# Patient Record
Sex: MALE | Race: WHITE | NOT HISPANIC OR LATINO | Employment: OTHER | URBAN - METROPOLITAN AREA
[De-identification: names, ages, dates, MRNs, and addresses within clinical notes are randomized per-mention and may not be internally consistent; named-entity substitution may affect disease eponyms.]

---

## 2017-08-28 ENCOUNTER — HOSPITAL ENCOUNTER (EMERGENCY)
Facility: HOSPITAL | Age: 57
Discharge: HOME | End: 2017-08-28
Attending: EMERGENCY MEDICINE | Admitting: EMERGENCY MEDICINE

## 2017-08-28 VITALS
DIASTOLIC BLOOD PRESSURE: 96 MMHG | WEIGHT: 205 LBS | TEMPERATURE: 98.1 F | HEART RATE: 59 BPM | HEIGHT: 70 IN | OXYGEN SATURATION: 99 % | SYSTOLIC BLOOD PRESSURE: 150 MMHG | BODY MASS INDEX: 29.35 KG/M2 | RESPIRATION RATE: 18 BRPM

## 2017-08-28 DIAGNOSIS — F19.982 DRUG INDUCED INSOMNIA (HCC): ICD-10-CM

## 2017-08-28 DIAGNOSIS — T45.525A: Primary | ICD-10-CM

## 2017-08-28 LAB
AMPHET+METHAMPHET UR QL: NEGATIVE
BARBITURATES UR QL SCN: NEGATIVE
BENZODIAZ UR QL SCN: NEGATIVE
CANNABINOIDS SERPL QL: NEGATIVE
COCAINE UR QL: NEGATIVE
ETHANOL BLD-MCNC: <10 MG/DL (ref 0–10)
ETHANOL UR QL: <0.01 %
HOLD SPECIMEN: NORMAL
HOLD SPECIMEN: NORMAL
METHADONE UR QL SCN: NEGATIVE
OPIATES UR QL: NEGATIVE
OXYCODONE UR QL SCN: POSITIVE
WHOLE BLOOD HOLD SPECIMEN: NORMAL
WHOLE BLOOD HOLD SPECIMEN: NORMAL

## 2017-08-28 PROCEDURE — 36415 COLL VENOUS BLD VENIPUNCTURE: CPT | Performed by: EMERGENCY MEDICINE

## 2017-08-28 PROCEDURE — 99284 EMERGENCY DEPT VISIT MOD MDM: CPT

## 2017-08-28 PROCEDURE — 90791 PSYCH DIAGNOSTIC EVALUATION: CPT | Performed by: SOCIAL WORKER

## 2017-08-28 PROCEDURE — 80307 DRUG TEST PRSMV CHEM ANLYZR: CPT | Performed by: EMERGENCY MEDICINE

## 2017-08-28 RX ORDER — OXYCODONE HYDROCHLORIDE 30 MG/1
TABLET ORAL
Status: ON HOLD | COMMUNITY
Start: 2017-01-12 | End: 2019-11-25

## 2017-08-28 RX ORDER — LORAZEPAM 1 MG/1
1 TABLET ORAL 2 TIMES DAILY PRN
COMMUNITY

## 2017-08-28 RX ORDER — CYPROHEPTADINE HYDROCHLORIDE 4 MG/1
4 TABLET ORAL 3 TIMES DAILY PRN
COMMUNITY
Start: 2017-08-27 | End: 2017-09-06 | Stop reason: HOSPADM

## 2017-08-28 RX ORDER — ESCITALOPRAM OXALATE 20 MG/1
20 TABLET ORAL
COMMUNITY
End: 2017-09-06 | Stop reason: HOSPADM

## 2017-09-04 ENCOUNTER — HOSPITAL ENCOUNTER (INPATIENT)
Facility: HOSPITAL | Age: 57
LOS: 2 days | Discharge: HOME | End: 2017-09-06
Attending: INTERNAL MEDICINE | Admitting: INTERNAL MEDICINE

## 2017-09-04 ENCOUNTER — APPOINTMENT (OUTPATIENT)
Dept: CT IMAGING | Facility: HOSPITAL | Age: 57
End: 2017-09-04

## 2017-09-04 DIAGNOSIS — G47.00 INSOMNIA, UNSPECIFIED TYPE: Primary | ICD-10-CM

## 2017-09-04 PROBLEM — G25.79 SEROTONIN SYNDROME: Status: ACTIVE | Noted: 2017-09-04

## 2017-09-04 PROBLEM — F41.9 ANXIETY: Chronic | Status: ACTIVE | Noted: 2017-09-04

## 2017-09-04 PROBLEM — M54.50 CHRONIC BILATERAL LOW BACK PAIN: Chronic | Status: ACTIVE | Noted: 2017-09-04

## 2017-09-04 PROBLEM — F31.12 BIPOLAR 1 DISORDER WITH MODERATE MANIA (HCC): Status: ACTIVE | Noted: 2017-09-04

## 2017-09-04 PROBLEM — F32.A DEPRESSION: Chronic | Status: ACTIVE | Noted: 2017-09-04

## 2017-09-04 PROBLEM — G89.29 CHRONIC BILATERAL LOW BACK PAIN: Chronic | Status: ACTIVE | Noted: 2017-09-04

## 2017-09-04 PROBLEM — F30.10 MANIC BEHAVIOR (HCC): Status: ACTIVE | Noted: 2017-09-04

## 2017-09-04 PROBLEM — F30.9 MANIA (HCC): Status: ACTIVE | Noted: 2017-09-04

## 2017-09-04 LAB
ALBUMIN SERPL-MCNC: 4.3 G/DL (ref 3.5–5.2)
ALBUMIN/GLOB SERPL: 1.5 G/DL
ALP SERPL-CCNC: 128 U/L (ref 39–117)
ALT SERPL W P-5'-P-CCNC: 71 U/L (ref 1–41)
ANION GAP SERPL CALCULATED.3IONS-SCNC: 12.3 MMOL/L
APTT PPP: 36.2 SECONDS (ref 22.7–35.4)
AST SERPL-CCNC: 53 U/L (ref 1–40)
BASOPHILS # BLD AUTO: 0.05 10*3/MM3 (ref 0–0.2)
BASOPHILS NFR BLD AUTO: 0.9 % (ref 0–1.5)
BILIRUB SERPL-MCNC: 0.4 MG/DL (ref 0.1–1.2)
BUN BLD-MCNC: 13 MG/DL (ref 6–20)
BUN/CREAT SERPL: 12.4 (ref 7–25)
CALCIUM SPEC-SCNC: 9.7 MG/DL (ref 8.6–10.5)
CHLORIDE SERPL-SCNC: 101 MMOL/L (ref 98–107)
CK SERPL-CCNC: 140 U/L (ref 20–200)
CO2 SERPL-SCNC: 28.7 MMOL/L (ref 22–29)
CREAT BLD-MCNC: 1.05 MG/DL (ref 0.76–1.27)
CRP SERPL-MCNC: 1.07 MG/DL (ref 0–0.5)
DEPRECATED RDW RBC AUTO: 39.1 FL (ref 37–54)
EOSINOPHIL # BLD AUTO: 0.21 10*3/MM3 (ref 0–0.7)
EOSINOPHIL NFR BLD AUTO: 3.8 % (ref 0.3–6.2)
ERYTHROCYTE [DISTWIDTH] IN BLOOD BY AUTOMATED COUNT: 13.4 % (ref 11.5–14.5)
ERYTHROCYTE [SEDIMENTATION RATE] IN BLOOD: 41 MM/HR (ref 0–20)
GFR SERPL CREATININE-BSD FRML MDRD: 73 ML/MIN/1.73
GLOBULIN UR ELPH-MCNC: 2.9 GM/DL
GLUCOSE BLD-MCNC: 104 MG/DL (ref 65–99)
HCT VFR BLD AUTO: 37.6 % (ref 40.4–52.2)
HGB BLD-MCNC: 12.5 G/DL (ref 13.7–17.6)
IMM GRANULOCYTES # BLD: 0 10*3/MM3 (ref 0–0.03)
IMM GRANULOCYTES NFR BLD: 0 % (ref 0–0.5)
INR PPP: 1.01 (ref 0.9–1.1)
LYMPHOCYTES # BLD AUTO: 1.17 10*3/MM3 (ref 0.9–4.8)
LYMPHOCYTES NFR BLD AUTO: 21.1 % (ref 19.6–45.3)
MAGNESIUM SERPL-MCNC: 2.1 MG/DL (ref 1.6–2.6)
MCH RBC QN AUTO: 26.9 PG (ref 27–32.7)
MCHC RBC AUTO-ENTMCNC: 33.2 G/DL (ref 32.6–36.4)
MCV RBC AUTO: 81 FL (ref 79.8–96.2)
MONOCYTES # BLD AUTO: 0.63 10*3/MM3 (ref 0.2–1.2)
MONOCYTES NFR BLD AUTO: 11.4 % (ref 5–12)
NEUTROPHILS # BLD AUTO: 3.49 10*3/MM3 (ref 1.9–8.1)
NEUTROPHILS NFR BLD AUTO: 62.8 % (ref 42.7–76)
PHOSPHATE SERPL-MCNC: 3.1 MG/DL (ref 2.5–4.5)
PLATELET # BLD AUTO: 228 10*3/MM3 (ref 140–500)
PMV BLD AUTO: 8.9 FL (ref 6–12)
POTASSIUM BLD-SCNC: 3.9 MMOL/L (ref 3.5–5.2)
PROT SERPL-MCNC: 7.2 G/DL (ref 6–8.5)
PROTHROMBIN TIME: 12.9 SECONDS (ref 11.7–14.2)
RBC # BLD AUTO: 4.64 10*6/MM3 (ref 4.6–6)
SODIUM BLD-SCNC: 142 MMOL/L (ref 136–145)
T4 FREE SERPL-MCNC: 0.92 NG/DL (ref 0.93–1.7)
TROPONIN T SERPL-MCNC: <0.01 NG/ML (ref 0–0.03)
TSH SERPL DL<=0.05 MIU/L-ACNC: 1.15 MIU/ML (ref 0.27–4.2)
WBC NRBC COR # BLD: 5.55 10*3/MM3 (ref 4.5–10.7)

## 2017-09-04 PROCEDURE — 84484 ASSAY OF TROPONIN QUANT: CPT | Performed by: INTERNAL MEDICINE

## 2017-09-04 PROCEDURE — 83735 ASSAY OF MAGNESIUM: CPT | Performed by: INTERNAL MEDICINE

## 2017-09-04 PROCEDURE — 86140 C-REACTIVE PROTEIN: CPT | Performed by: INTERNAL MEDICINE

## 2017-09-04 PROCEDURE — 25010000002 THIAMINE PER 100 MG: Performed by: INTERNAL MEDICINE

## 2017-09-04 PROCEDURE — 82747 ASSAY OF FOLIC ACID RBC: CPT | Performed by: INTERNAL MEDICINE

## 2017-09-04 PROCEDURE — 93005 ELECTROCARDIOGRAM TRACING: CPT | Performed by: INTERNAL MEDICINE

## 2017-09-04 PROCEDURE — 25010000002 CYANOCOBALAMIN PER 1000 MCG: Performed by: INTERNAL MEDICINE

## 2017-09-04 PROCEDURE — 84100 ASSAY OF PHOSPHORUS: CPT | Performed by: INTERNAL MEDICINE

## 2017-09-04 PROCEDURE — 70450 CT HEAD/BRAIN W/O DYE: CPT

## 2017-09-04 PROCEDURE — 84443 ASSAY THYROID STIM HORMONE: CPT | Performed by: INTERNAL MEDICINE

## 2017-09-04 PROCEDURE — 85610 PROTHROMBIN TIME: CPT | Performed by: INTERNAL MEDICINE

## 2017-09-04 PROCEDURE — 82550 ASSAY OF CK (CPK): CPT | Performed by: INTERNAL MEDICINE

## 2017-09-04 PROCEDURE — 85014 HEMATOCRIT: CPT | Performed by: INTERNAL MEDICINE

## 2017-09-04 PROCEDURE — 84439 ASSAY OF FREE THYROXINE: CPT | Performed by: INTERNAL MEDICINE

## 2017-09-04 PROCEDURE — 25810000003 SODIUM CHLORIDE 0.9 % WITH KCL 20 MEQ 20-0.9 MEQ/L-% SOLUTION: Performed by: INTERNAL MEDICINE

## 2017-09-04 PROCEDURE — 85025 COMPLETE CBC W/AUTO DIFF WBC: CPT | Performed by: INTERNAL MEDICINE

## 2017-09-04 PROCEDURE — 93010 ELECTROCARDIOGRAM REPORT: CPT | Performed by: INTERNAL MEDICINE

## 2017-09-04 PROCEDURE — 85730 THROMBOPLASTIN TIME PARTIAL: CPT | Performed by: INTERNAL MEDICINE

## 2017-09-04 PROCEDURE — 85652 RBC SED RATE AUTOMATED: CPT | Performed by: INTERNAL MEDICINE

## 2017-09-04 PROCEDURE — 80053 COMPREHEN METABOLIC PANEL: CPT | Performed by: INTERNAL MEDICINE

## 2017-09-04 PROCEDURE — 82652 VIT D 1 25-DIHYDROXY: CPT | Performed by: INTERNAL MEDICINE

## 2017-09-04 PROCEDURE — 99253 IP/OBS CNSLTJ NEW/EST LOW 45: CPT | Performed by: PSYCHIATRY & NEUROLOGY

## 2017-09-04 PROCEDURE — 86592 SYPHILIS TEST NON-TREP QUAL: CPT | Performed by: INTERNAL MEDICINE

## 2017-09-04 RX ORDER — SODIUM CHLORIDE 0.9 % (FLUSH) 0.9 %
1-10 SYRINGE (ML) INJECTION AS NEEDED
Status: DISCONTINUED | OUTPATIENT
Start: 2017-09-04 | End: 2017-09-06 | Stop reason: HOSPADM

## 2017-09-04 RX ORDER — HYDROMORPHONE HYDROCHLORIDE 1 MG/ML
0.5 INJECTION, SOLUTION INTRAMUSCULAR; INTRAVENOUS; SUBCUTANEOUS EVERY 4 HOURS PRN
Status: DISCONTINUED | OUTPATIENT
Start: 2017-09-04 | End: 2017-09-06 | Stop reason: HOSPADM

## 2017-09-04 RX ORDER — CHOLECALCIFEROL (VITAMIN D3) 125 MCG
500 CAPSULE ORAL DAILY
Status: DISCONTINUED | OUTPATIENT
Start: 2017-09-04 | End: 2017-09-05

## 2017-09-04 RX ORDER — LORAZEPAM 1 MG/1
1 TABLET ORAL 2 TIMES DAILY PRN
Status: DISCONTINUED | OUTPATIENT
Start: 2017-09-04 | End: 2017-09-04

## 2017-09-04 RX ORDER — BISACODYL 10 MG
10 SUPPOSITORY, RECTAL RECTAL DAILY PRN
Status: DISCONTINUED | OUTPATIENT
Start: 2017-09-04 | End: 2017-09-06 | Stop reason: HOSPADM

## 2017-09-04 RX ORDER — ONDANSETRON 2 MG/ML
4 INJECTION INTRAMUSCULAR; INTRAVENOUS EVERY 6 HOURS PRN
Status: DISCONTINUED | OUTPATIENT
Start: 2017-09-04 | End: 2017-09-06 | Stop reason: HOSPADM

## 2017-09-04 RX ORDER — CYPROHEPTADINE HYDROCHLORIDE 4 MG/1
8 TABLET ORAL 3 TIMES DAILY
Status: DISCONTINUED | OUTPATIENT
Start: 2017-09-04 | End: 2017-09-04

## 2017-09-04 RX ORDER — CYANOCOBALAMIN 1000 UG/ML
1000 INJECTION, SOLUTION INTRAMUSCULAR; SUBCUTANEOUS ONCE
Status: COMPLETED | OUTPATIENT
Start: 2017-09-04 | End: 2017-09-04

## 2017-09-04 RX ORDER — TRAZODONE HYDROCHLORIDE 100 MG/1
200 TABLET ORAL NIGHTLY
Status: DISCONTINUED | OUTPATIENT
Start: 2017-09-04 | End: 2017-09-05

## 2017-09-04 RX ORDER — NALOXONE HCL 0.4 MG/ML
0.4 VIAL (ML) INJECTION
Status: DISCONTINUED | OUTPATIENT
Start: 2017-09-04 | End: 2017-09-06 | Stop reason: HOSPADM

## 2017-09-04 RX ORDER — ACETAMINOPHEN 325 MG/1
650 TABLET ORAL EVERY 6 HOURS PRN
Status: DISCONTINUED | OUTPATIENT
Start: 2017-09-04 | End: 2017-09-06 | Stop reason: HOSPADM

## 2017-09-04 RX ORDER — LORAZEPAM 1 MG/1
2 TABLET ORAL EVERY 6 HOURS PRN
Status: DISCONTINUED | OUTPATIENT
Start: 2017-09-04 | End: 2017-09-06 | Stop reason: HOSPADM

## 2017-09-04 RX ORDER — CYPROHEPTADINE HYDROCHLORIDE 4 MG/1
8 TABLET ORAL 3 TIMES DAILY
Status: DISCONTINUED | OUTPATIENT
Start: 2017-09-04 | End: 2017-09-05

## 2017-09-04 RX ORDER — SODIUM CHLORIDE AND POTASSIUM CHLORIDE 150; 900 MG/100ML; MG/100ML
125 INJECTION, SOLUTION INTRAVENOUS CONTINUOUS
Status: DISCONTINUED | OUTPATIENT
Start: 2017-09-04 | End: 2017-09-05

## 2017-09-04 RX ORDER — CALCIUM CARBONATE 200(500)MG
1 TABLET,CHEWABLE ORAL 2 TIMES DAILY PRN
Status: DISCONTINUED | OUTPATIENT
Start: 2017-09-04 | End: 2017-09-06 | Stop reason: HOSPADM

## 2017-09-04 RX ORDER — OXYCODONE HYDROCHLORIDE 15 MG/1
30 TABLET ORAL EVERY 6 HOURS PRN
Status: DISCONTINUED | OUTPATIENT
Start: 2017-09-04 | End: 2017-09-06 | Stop reason: HOSPADM

## 2017-09-04 RX ORDER — SERTRALINE HYDROCHLORIDE 100 MG/1
100 TABLET, FILM COATED ORAL DAILY
COMMUNITY

## 2017-09-04 RX ADMIN — LORAZEPAM 2 MG: 1 TABLET ORAL at 17:50

## 2017-09-04 RX ADMIN — CYANOCOBALAMIN 1000 MCG: 1000 INJECTION, SOLUTION INTRAMUSCULAR at 13:30

## 2017-09-04 RX ADMIN — CYPROHEPTADINE HYDROCHLORIDE 8 MG: 4 TABLET ORAL at 22:19

## 2017-09-04 RX ADMIN — OXYCODONE HYDROCHLORIDE 30 MG: 15 TABLET ORAL at 22:18

## 2017-09-04 RX ADMIN — OXYCODONE HYDROCHLORIDE 30 MG: 15 TABLET ORAL at 13:28

## 2017-09-04 RX ADMIN — CYANOCOBALAMIN TAB 500 MCG 500 MCG: 500 TAB at 13:29

## 2017-09-04 RX ADMIN — THIAMINE HYDROCHLORIDE 100 MG: 100 INJECTION, SOLUTION INTRAMUSCULAR; INTRAVENOUS at 13:49

## 2017-09-04 RX ADMIN — POTASSIUM CHLORIDE AND SODIUM CHLORIDE 125 ML/HR: 900; 150 INJECTION, SOLUTION INTRAVENOUS at 22:18

## 2017-09-04 RX ADMIN — TRAZODONE HYDROCHLORIDE 200 MG: 100 TABLET, FILM COATED ORAL at 22:18

## 2017-09-04 RX ADMIN — POTASSIUM CHLORIDE AND SODIUM CHLORIDE 125 ML/HR: 900; 150 INJECTION, SOLUTION INTRAVENOUS at 13:49

## 2017-09-04 NOTE — CONSULTS
Patient Identification:  NAME:  German Sotelo  Age:  57 y.o.   Sex:  male   :  1960   MRN:  [id number]       Chief complaint: He doesn't have one, reason for consult depression anxiety confusion possible serotonin syndrome    History of present illness:  This gentleman is a 57-year-old right-handed white male with a history of depression and insomnia previous history of serotonin syndrome who over the last few weeks was switched from Zoloft to Lexapro.  He is been becoming more agitated and restless not wanting to sleep having to go for a walk instead of going to bed at night he did have serotonin syndrome as context about 5 years ago.  They thought his family this time 30 could be having recurrent serotonin syndrome and Lexapro.  Zoloft reinitiated he didn't get any better is come to the hospital and has been started on cyproheptadine.  Duration of the symptoms been the last few weeks quality is progressive onset of the symptoms context patient had previous serotonin syndrome modifying factors here we stopped the Zoloft.  And he is on cyproheptadine  Past medical history:  Past Medical History:   Diagnosis Date   • Anxiety    • Depression    • Insomnia    • Serotonin syndrome 2017   • Spinal stenosis        Past surgical history:  Past Surgical History:   Procedure Laterality Date   • BACK SURGERY      SPINAL FUSION   • JOINT REPLACEMENT     • KNEE ARTHROPLASTY Left    • PAIN PUMP INSERTION/REVISION      SEVERAL TIMES STARATED  TOTAL OF FOUR    • PAIN PUMP INSERTION/REVISION Left 2016    Procedure: LT PAIN PUMP REMOVAL;  Surgeon: Esvin Maya MD;  Location: [facility];  Service:        Allergies:  Lunesta [eszopiclone]    Home medications:  Prescriptions Prior to Admission   Medication Sig Dispense Refill Last Dose   • sertraline (ZOLOFT) 100 MG tablet Take 100 mg by mouth Daily.      • cyproheptadine (PERIACTIN) 4 MG tablet Take 4 mg by mouth 3 (Three) Times a Day As  Needed.      • escitalopram (LEXAPRO) 20 MG tablet Take 20 mg by mouth.      • LORazepam (ATIVAN) 1 MG tablet Take 1 mg by mouth 2 (Two) Times a Day As Needed for Anxiety.      • oxyCODONE (ROXICODONE) 30 MG immediate release tablet every 4 (four) hours as needed       • traZODone (DESYREL) 100 MG tablet Take 200 mg by mouth Every Night.   7/10/2016 at 2300        Hospital medications:    cyproheptadine 8 mg Oral TID   [START ON 9/5/2017] enoxaparin 40 mg Subcutaneous Nightly   thiamine (VITAMIN B1) IVPB 100 mg Intravenous Daily   traZODone 200 mg Oral Nightly   vitamin B-12 500 mcg Oral Daily       Pharmacy Consult    sodium chloride 0.9 % with KCl 20 mEq 125 mL/hr     •  acetaminophen  •  bisacodyl  •  calcium carbonate  •  HYDROmorphone **AND** naloxone  •  LORazepam  •  magnesium hydroxide  •  ondansetron  •  oxyCODONE  •  Pharmacy Consult  •  sodium chloride    Family history:  No family history on file.    Social history:  Social History   Substance Use Topics   • Smoking status: Never Smoker   • Smokeless tobacco: Not on file   • Alcohol use No       Review of systems:    No hair eyes ears nose throat skin bone joint  lymphatic hematologic or oncologic plates no neck pain chest pain abdominal pain bowel bladder incontinence fever chills or rash mental status changes restlessness confusion poor sleep and overall poor balance.      Objective:  Vitals Ranges:   Temp:  [98.2 °F (36.8 °C)] 98.2 °F (36.8 °C)  Heart Rate:  [72] 72  Resp:  [20] 20  BP: (114)/(81) 114/81      Physical Exam:  Awake alert oriented ×3 in no distress fund of knowledge attention span and concentration recent remote memory and language is all normal well-developed well-nourished in no distress pupils to constricting to 1 normal disc retinas visual fields extraocular movements full without nystagmus nasolabial folds palate tongue symmetrical normal hearing facial sensation head turning shoulder shrug motor 5 out of 5 times for 70s no  pronator drift atrophy or fasciculations reflexes 1+ symmetrical toes downgoing bilaterally sensation normal light touch face both arms and both legs coordination normal in the upper externally station and gait was not tested heart regular without murmur neck supple without bruits extremities no clubbing cyanosis or edema awake alert oriented ×3 visual acuity normal at 3 feet    Results review:   I reviewed the patient's new clinical results.    Data review:  Lab Results (last 24 hours)     Procedure Component Value Units Date/Time    Vitamin D 1,25 Dihydroxy [[id number]] Collected:  09/04/17 0922    Specimen:  Blood Updated:  09/04/17 0930    Folate RBC [[id number]] Collected:  09/04/17 0922    Specimen:  Blood Updated:  09/04/17 0930    RPR [[id number]] Collected:  09/04/17 0922    Specimen:  Blood Updated:  09/04/17 0932    CBC Auto Differential [[id number]]  (Abnormal) Collected:  09/04/17 0922    Specimen:  Blood Updated:  09/04/17 0939     WBC 5.55 10*3/mm3      RBC 4.64 10*6/mm3      Hemoglobin 12.5 (L) g/dL      Hematocrit 37.6 (L) %      MCV 81.0 fL      MCH 26.9 (L) pg      MCHC 33.2 g/dL      RDW 13.4 %      RDW-SD 39.1 fl      MPV 8.9 fL      Platelets 228 10*3/mm3      Neutrophil % 62.8 %      Lymphocyte % 21.1 %      Monocyte % 11.4 %      Eosinophil % 3.8 %      Basophil % 0.9 %      Immature Grans % 0.0 %      Neutrophils, Absolute 3.49 10*3/mm3      Lymphocytes, Absolute 1.17 10*3/mm3      Monocytes, Absolute 0.63 10*3/mm3      Eosinophils, Absolute 0.21 10*3/mm3      Basophils, Absolute 0.05 10*3/mm3      Immature Grans, Absolute 0.00 10*3/mm3     aPTT [[id number]]  (Abnormal) Collected:  09/04/17 0922    Specimen:  Blood Updated:  09/04/17 0955     PTT 36.2 (H) seconds     Protime-INR [[id number]]  (Normal) Collected:  09/04/17 0922    Specimen:  Blood Updated:  09/04/17 0955     Protime 12.9 Seconds      INR 1.01    Magnesium [[id number]]  (Normal) Collected:  09/04/17 0922    Specimen:  Blood Updated:   09/04/17 1004     Magnesium 2.1 mg/dL     CK [[id number]]  (Normal) Collected:  09/04/17 0922    Specimen:  Blood Updated:  09/04/17 1004     Creatine Kinase 140 U/L     Comprehensive Metabolic Panel [[id number]]  (Abnormal) Collected:  09/04/17 0922    Specimen:  Blood Updated:  09/04/17 1004     Glucose 104 (H) mg/dL      BUN 13 mg/dL      Creatinine 1.05 mg/dL      Sodium 142 mmol/L      Potassium 3.9 mmol/L      Chloride 101 mmol/L      CO2 28.7 mmol/L      Calcium 9.7 mg/dL      Total Protein 7.2 g/dL      Albumin 4.30 g/dL      ALT (SGPT) 71 (H) U/L      AST (SGOT) 53 (H) U/L      Alkaline Phosphatase 128 (H) U/L      Total Bilirubin 0.4 mg/dL      eGFR Non African Amer 73 mL/min/1.73      Globulin 2.9 gm/dL      A/G Ratio 1.5 g/dL      BUN/Creatinine Ratio 12.4     Anion Gap 12.3 mmol/L     Phosphorus [[id number]]  (Normal) Collected:  09/04/17 0922    Specimen:  Blood Updated:  09/04/17 1004     Phosphorus 3.1 mg/dL     C-reactive Protein [[id number]]  (Abnormal) Collected:  09/04/17 0922    Specimen:  Blood Updated:  09/04/17 1004     C-Reactive Protein 1.07 (H) mg/dL     Sedimentation Rate [[id number]]  (Abnormal) Collected:  09/04/17 0922    Specimen:  Blood Updated:  09/04/17 1013     Sed Rate 41 (H) mm/hr     T4, Free [[id number]]  (Abnormal) Collected:  09/04/17 0922    Specimen:  Blood Updated:  09/04/17 1013     Free T4 0.92 (L) ng/dL     Troponin [[id number]]  (Normal) Collected:  09/04/17 0922    Specimen:  Blood Updated:  09/04/17 1013     Troponin T <0.010 ng/mL     Narrative:       Troponin T Reference Ranges:  Less than 0.03 ng/mL:    Negative for AMI  0.03 to 0.09 ng/mL:      Indeterminant for AMI  Greater than 0.09 ng/mL: Positive for AMI    TSH [[id number]]  (Normal) Collected:  09/04/17 0922    Specimen:  Blood Updated:  09/04/17 1013     TSH 1.150 mIU/mL            Imaging:  Imaging Results (last 24 hours)     ** No results found for the last 24 hours. **             Assessment and Plan:      Principal Problem:    Bonita  Active Problems:    Insomnia    Depression    Anxiety    Chronic bilateral low back pain    Manic behavior    This gentleman is behavior it seems consistent with classic bipolar disorder.  I'm not convinced that he is had a serotonin syndrome nor does he look like he has a stroke TIA seizure or central nervous system infection.  I agree with checking MRI of the brain as well as an EEG thyroid profile and B12.  His loved ones in the room don't seem to believe that he could have bipolar disorder and think it's something to do with his medications.  It is possible but I just think unlikely.      True Livingston MD  09/04/17  1:36 PM

## 2017-09-04 NOTE — H&P
"PCP: Umm Roy MD    Chief complaint restless, insomnia    HPI  Patient is a 57 y.o. male presents with h/o Serotonin Syndrome about 5 years ago who presents with height mood, irritable, restless, insomnia, shaking.  Patient has had multiple back surgeries that had complications and the fallout has been chronic pain (pain pumps that have malfunctioned and currently on PO pain med thru pain management).  He has struggled with depression and to a lesser degree anxiety. Patient has been followed by Dr. Del Cid as an outpatient for years and has been on Zoloft for years and 1 month ago the patient was switched from sertraline 200 mg to Lexapro 20 mg (without taper) due to the patient really struggling with depressed mood and \"wanted to be dead\".  9 days prior to admission the patient became agitated and restless and the family went to [facility] the next day and they thought was possible serotonin syndrome and was given antihistamine/cyproheptadine, however it did not seem to help much.  They followed up with the primary care doctor and she felt uncomfortable with the patient and told them to go to [facility] to be evaluated because they had a inpatient psychiatry unit.  Therefore, 1 week ago the patient was seen here in the ER and the psych nurse evaluated and since the patient had only been like this for a couple days they did not feel like met admission criteria, urine drug screen only revealing for his chronic opiates.  The ER stopped the Lexapro at that time.  The patient continued to have restlessness, not sleeping despite taking Ativan multiple doses even though he does not take this on a regular basis.  He had increased energy, wandering, impaired judgment he took his shirt off in a waiting room, acting oddly at daughter's baby shower.  He lacks insight and unable to remember periods of time.  Patient just keeps saying that there is something wrong in his head and he can't control it.  He is only " been sleeping about 2 hours a night for the past 9 days now (and that after multiple doses of Ativan ). The wife has not been able to leave him for the past week except for with a sitter.  She is concerned that the patient could harm himself inadvertently.  He's been hyperactive with walking 10 miles and wouldn't stop.  He gets distracted and obsessed with trash in the emergency room and he went around picking things up.  He is had shaking episodes with his hands and picking at things.     Patient went back to his psychiatrist who has known him for years and per wife's report he knew the patient's personality and this is not like him.  He wasn't improving and he did not feel like it was medication related however he placed him on a lower dose (100mg) of Zoloft 3 days ago.  They call primary care doctor back and she did not know what to do next and just said to go to [facility].  Patient took cyproheptadine 8 mg last night and 2 mg of Ativan.  He did get 3-4 hours of sleep last night which is more than what he is gotten this whole week.     Patient states that he has to just keep moving and he can't control it.  It's hard to keep his balance.  He says his thoughts are racing but they don't make sense.  He doesn't remember anything clearly.  There are bits and pieces but hold days are missing over this past week.  Denies any auditory hallucinations he says that he does have some visual things where he thinks he sees something and then it's not better.  His thoughts of an racing he says this feels similar to when he had the serotonin syndrome 5 years ago however it did not escalate to that extreme but has maintained and not improved nor gotten worse. Decreased eating and appetite however they state that he's had a 5-6 pound weight gain from when he was laid in the ER until now.  Wife is monitoring blood pressures been 140/90 with heart rate in the 70s at home.  However he has not been able to do his ADLs.   Denies any diarrhea and no fevers no sweating known dilated eyes.      PAST MEDICAL HISTORY  Past Medical History:   Diagnosis Date   • Anxiety    • Depression    • Insomnia    • Serotonin syndrome 5-6 yrs ago   • Spinal stenosis        PAST SURGICAL HISTORY  Past Surgical History:   Procedure Laterality Date   • BACK SURGERY  2004    SPINAL FUSION   • KNEE ARTHROPLASTY Left 2008   • PAIN PUMP INSERTION/REVISION      SEVERAL TIMES STARATED 2010 TOTAL OF FOUR    • PAIN PUMP INSERTION/REVISION Left 7/11/2016    Procedure: LT PAIN PUMP REMOVAL;  Surgeon: Esvin Maya MD;  Location: [facility] OR;  Service:        FAMILY HISTORY  No family history on file.    SOCIAL HISTORY  Social History   Substance Use Topics   • Smoking status: Never Smoker   • Smokeless tobacco: Not on file   • Alcohol use No       MEDICATIONS:  Prescriptions Prior to Admission   Medication Sig Dispense Refill Last Dose   • cyproheptadine (PERIACTIN) 4 MG tablet Take 4 mg by mouth.      • escitalopram (LEXAPRO) 20 MG tablet Take 20 mg by mouth.      • LORazepam (ATIVAN) 1 MG tablet Take 1 mg by mouth 2 (Two) Times a Day As Needed for Anxiety.      • oxyCODONE (ROXICODONE) 30 MG immediate release tablet every 4 (four) hours as needed       • traZODone (DESYREL) 100 MG tablet Take 200 mg by mouth Every Night.   7/10/2016 at 2300       Allergies:  Lunesta [eszopiclone]    Review of Systems:  +visual hallucinations, anxiousness  Negative for following (except as per HPI):  Constitution: chills, fevers, fatigue   Eyes: change of vision, loss of vision and discharge  ENT: ear drainage, ear ringing and facial trauma  Respiratory: cough, pleuritic pain, shortness of air  Cardiovascular: chest pressure, pain, lower extremity edema, palpitations  Gastrointestinal: constipation, diarrhea, nausea, vomiting, pain    Integument: rash and wound  Hematologic / Lymphatic: excessive bleeding and easy bruising  Musculoskeletal: joint pain, joint stiffness,  joint swelling and muscle pain  Neurological: headaches, numbness, seizures and tremors  Behavioral / Psych: , depression         Vital Signs  Temp:  [98.2 °F (36.8 °C)] 98.2 °F (36.8 °C)  Heart Rate:  [72] 72  Resp:  [20] 20  BP: (114)/(81) 114/81       Physical Exam:  General Appearance:    Alert, cooperative, in no acute distress, Fidgeting    Head:    Normocephalic, without obvious abnormality, atraumatic   Eyes:         conjunctivae and sclerae normal, no icterus, PERRLA   ENT:    Ears grossly intact, oral mucosa moist,   Neck:   No adenopathy, supple, trachea midline,    Back:     Normal to inspection, range of motion normal   Lungs:     Clear to auscultation,respirations regular, even and                   unlabored    Heart:    Regular rhythm and normal rate,  no murmur, normal S1 and S2,   Abdomen:     Normal bowel sounds, no masses,  soft non-tender, non-distended,    Extremities:   Moves all extremities well, no cyanosis, no edema,             Pulses:   Pulses palpable and equal bilaterally   Skin:   No bleeding, rash, bruising    Neurologic:    Psych:   Cranial nerves 2 - 12 grossly intact, sensation intact,     Moves all extremities well, equal bilateral strength, no rigidity    Alert and Oriented x 3, flat Affect, poor insight, seems fearful   LABS:  Admission on 09/04/2017   Component Date Value Ref Range Status   • WBC 09/04/2017 5.55  4.50 - 10.70 10*3/mm3 Final   • RBC 09/04/2017 4.64  4.60 - 6.00 10*6/mm3 Final   • Hemoglobin 09/04/2017 12.5* 13.7 - 17.6 g/dL Final   • Hematocrit 09/04/2017 37.6* 40.4 - 52.2 % Final   • MCV 09/04/2017 81.0  79.8 - 96.2 fL Final   • MCH 09/04/2017 26.9* 27.0 - 32.7 pg Final   • MCHC 09/04/2017 33.2  32.6 - 36.4 g/dL Final   • RDW 09/04/2017 13.4  11.5 - 14.5 % Final   • RDW-SD 09/04/2017 39.1  37.0 - 54.0 fl Final   • MPV 09/04/2017 8.9  6.0 - 12.0 fL Final   • Platelets 09/04/2017 228  140 - 500 10*3/mm3 Final   • Neutrophil % 09/04/2017 62.8  42.7 - 76.0 % Final    • Lymphocyte % 09/04/2017 21.1  19.6 - 45.3 % Final   • Monocyte % 09/04/2017 11.4  5.0 - 12.0 % Final   • Eosinophil % 09/04/2017 3.8  0.3 - 6.2 % Final   • Basophil % 09/04/2017 0.9  0.0 - 1.5 % Final   • Immature Grans % 09/04/2017 0.0  0.0 - 0.5 % Final   • Neutrophils, Absolute 09/04/2017 3.49  1.90 - 8.10 10*3/mm3 Final   • Lymphocytes, Absolute 09/04/2017 1.17  0.90 - 4.80 10*3/mm3 Final   • Monocytes, Absolute 09/04/2017 0.63  0.20 - 1.20 10*3/mm3 Final   • Eosinophils, Absolute 09/04/2017 0.21  0.00 - 0.70 10*3/mm3 Final   • Basophils, Absolute 09/04/2017 0.05  0.00 - 0.20 10*3/mm3 Final   • Immature Grans, Absolute 09/04/2017 0.00  0.00 - 0.03 10*3/mm3 Final   • PTT 09/04/2017 36.2* 22.7 - 35.4 seconds Final   • Creatine Kinase 09/04/2017 140  20 - 200 U/L Final   • Glucose 09/04/2017 104* 65 - 99 mg/dL Final   • BUN 09/04/2017 13  6 - 20 mg/dL Final   • Creatinine 09/04/2017 1.05  0.76 - 1.27 mg/dL Final   • Sodium 09/04/2017 142  136 - 145 mmol/L Final   • Potassium 09/04/2017 3.9  3.5 - 5.2 mmol/L Final   • Chloride 09/04/2017 101  98 - 107 mmol/L Final   • CO2 09/04/2017 28.7  22.0 - 29.0 mmol/L Final   • Calcium 09/04/2017 9.7  8.6 - 10.5 mg/dL Final   • Total Protein 09/04/2017 7.2  6.0 - 8.5 g/dL Final   • Albumin 09/04/2017 4.30  3.50 - 5.20 g/dL Final   • ALT (SGPT) 09/04/2017 71* 1 - 41 U/L Final   • AST (SGOT) 09/04/2017 53* 1 - 40 U/L Final   • Alkaline Phosphatase 09/04/2017 128* 39 - 117 U/L Final   • Total Bilirubin 09/04/2017 0.4  0.1 - 1.2 mg/dL Final   • eGFR Non African Amer 09/04/2017 73  >60 mL/min/1.73 Final   • Globulin 09/04/2017 2.9  gm/dL Final   • A/G Ratio 09/04/2017 1.5  g/dL Final   • BUN/Creatinine Ratio 09/04/2017 12.4  7.0 - 25.0 Final   • Anion Gap 09/04/2017 12.3  mmol/L Final   • Free T4 09/04/2017 0.92* 0.93 - 1.70 ng/dL Final   • Magnesium 09/04/2017 2.1  1.6 - 2.6 mg/dL Final   • Phosphorus 09/04/2017 3.1  2.5 - 4.5 mg/dL Final   • Protime 09/04/2017 12.9  11.7 -  14.2 Seconds Final   • INR 09/04/2017 1.01  0.90 - 1.10 Final   • Troponin T 09/04/2017 <0.010  0.000 - 0.030 ng/mL Final   • TSH 09/04/2017 1.150  0.270 - 4.200 mIU/mL Final   • Sed Rate 09/04/2017 41* 0 - 20 mm/hr Final   • C-Reactive Protein 09/04/2017 1.07* 0.00 - 0.50 mg/dL Final       DIAGNOSTICS:  No results found.       Results Review:   I reviewed the patient's new clinical results.  I personally viewed and interpreted the patient's EKG/Telemetry data- sinus  Old records reviewed - see above  Outside records requested per Dr. Del Cid and Dr. Roy    ASSESSMENT AND PLAN     +Acute Bonita/Manic Behavior- likely organic bonita/secondary caused  -Differential diagnosis: NEURO (seizure, neoplasm, stroke, less likely [place]'s/MS, No h/o trauma), TOXIC (anti-depressant-induced bonita, Serotonin synd), METABOLIC (thyroid, thiamine, B12, niacin deficiencies, [place]), PSYCH, vs INFECTIOUS (neurosyphilis, herpes, encephalitis)  -Discussed with pharmacist and she would've thought that since the Zoloft and Lexapro were not titrated down and that the serotonin syndrome would've manifested itself about 1 week later and the symptoms started about 4 weeks after.  The cyproheptadine does not seem to have helped much so not sure that this is true serotonin syndrome and he didn't have any muscle rigidity diarrhea or sweating either.   -Continue in the cyproheptadine for now, holding the SSRI  -Getting CT head  -Consult neurology and psychiatry  -Pharmacy consult for if this is a antidepressant-induced bonita should we taper or stop SSRI completely  -EEG  -Labs to look for vitamin D, folate, thiamine, B12, thyroid, a.m. Cortisol, rpr  -If unrevealing could consider MRI to rule out tumor or vascular lesion and LP to look for herpes encephalitis, viral encephalitis, and/or cryptococcal (has been seen in non-immunosuppressed)    + Insomnia/  Depression/  Anxiety  - Holding SSRI until after psychiatrist sees patient  -We'll  continue the trazodone     + Chronic bilateral low back pain  -Patient is on oxycodone immediate release 4 times a day when necessary    + DVT prophylaxis-lovenox 40  + Medical decision maker-wife Yahaira    I discussed the patients findings and my recommendations with the patient and/or family.  Please reference all orders placed.    Pinky Hill MD  09/04/17  8:23 AM

## 2017-09-04 NOTE — CONSULTS
"IDENTIFYING INFORMATION: The patient is a 57-year-old white male admitted with confusion and mental status changes possibly consistent with a serotonin syndrome.    CHIEF COMPLAINT:  None given    INFORMANT:  Patient, wife and chart    RELIABILITY:  Good    HISTORY OF PRESENT ILLNESS: The patient is a 57-year-old white male who is been followed by Dr. Vannesa Del Cid for the past 13 years.  The patient has a history of positive response to Zoloft, but this medication was recently discontinued and the patient instead begun on Lexapro after Dr. Del Cid and the patient agreed that Zoloft did not seem to be as effective in addressing his depressive symptoms.  With that medication change, the patient began to experience mental status changes, restlessness, confusion, poor sleep and poor balance.  5 years ago, the patient apparently had an episode of serotonin syndrome which been brought on by addition of Lunesta to his previously prescribed antidepressant regimen.  Dr. Del Cid and the patient's family became concerned that he was perhaps having a recurrence of serotonin syndrome, Lexapro was stopped, and Zoloft reinitiated.  The patient's symptoms did not resolve with that medication change however.  Since that time Zoloft has been discontinued and the patient has been started on cyproheptadine.  When seen today the patient seemed calm and pleasant cooperative and exhibits no confusion or other stigmata of serotonin syndrome or delirium.  Review of labs does not indicate an alternate etiology for his current symptoms.    The patient lives at home with his wife.  He does not work having been disabled by a series of \"failed back surgeries\".  He suffers from chronic pain and is on chronic pain medication.    Patient denies current suicidal homicidal risk ideation or any psychotic symptoms.  He is fully oriented and pleasant during today's interview.    PAST PSYCHIATRIC HISTORY: The patient is never been psychiatric " hospitalized and denies any prior history of suicide attempts or gestures.  He has been recently stable with fairly conservative antidepressant therapy.    PAST MEDICAL HISTORY:  As noted previously the patient has a history of chronic back pain and takes Roxicodone at home for this    MEDICATIONS: The patient's home medications included Zoloft cyproheptadine Ativan Roxicodone and Desyrel    ALLERGIES:  Lunesta    FAMILY HISTORY:  Noncontributory    SOCIAL HISTORY: The patient lives with his wife, and is disabled as described previously.  There is no reported use of alcohol tobacco or street drugs.    MENTAL STATUS EXAM: Patient is well-developed well-nourished white male appearing his stated age.  He is no apparent physical distress at the time of the examination.  He is awake alert and oriented in all spheres, his mood is euthymic his affect congruent.  Speech is generally relevant and coherent there are no gross deficits memory cognition noted.  The patient is cooperative throughout interview, his intelligence is judged to be in the average range.  He denies current suicidal homicidal ideation or psychotic features.  His judgment and insight appear to be intact.    ASSETS/LIABILITIES: Supportive family/chronic pain    DIAGNOSTIC IMPRESSION: Major depressive disorder recurrent moderate, delirium, possible serotonin syndrome, chronic back pain    PLAN:  The patient's current symptoms may be consistent with a serotonin syndrome though I would submit that some of his symptoms could possibly consistent with a delirium or even a hypomanic episode.  Administration of a selective serotonin reuptake inhibitor, particularly one that has been tolerated by the patient for several years, without additional risk factors (other serotonergic agents such as tramadol, Ultram or certain migraine medications) would not tend to cause serotonin syndrome.  Further, his symptoms don't seem to be entirely consistent with serotonin  syndrome given absence of diarrhea and neuromuscular symptoms.  I fully agree with involvement of neurology to see what their opinion of the patient's current status is.    In the meantime, I would recommend holding antidepressant medication, leaving to the discretion of Dr. Del Cid reinitiation of medications from a different class if in fact it is felt that the patient has in fact suffered for serotonin syndrome.  I we will increase dramatically the frequency and dose of Ativan given the degree of concern the patient's and his family have over his current symptoms of anxiety.    Thank you very much for the opportunity to see this patient.

## 2017-09-04 NOTE — PLAN OF CARE
Problem: Patient Care Overview (Adult)  Goal: Discharge Needs Assessment    09/04/17 2660   Discharge Needs Assessment   Concerns To Be Addressed no discharge needs identified   Readmission Within The Last 30 Days no previous admission in last 30 days   Equipment Needed After Discharge none   Discharge Facility/Level Of Care Needs home with home health   Current Discharge Risk chronically ill   Discharge Disposition still a patient   Current Health   Anticipated Changes Related to Illness none   Self-Care   Equipment Currently Used at Home none   Living Environment   Transportation Available car;family or friend will provide         Problem: Manic Behavior/Episode (Adult)  Goal: Identify Related Risk Factors and Signs and Symptoms  Outcome: Outcome(s) achieved Date Met:  09/04/17  Goal: Mood Equilibrium  Outcome: Ongoing (interventions implemented as appropriate)  Goal: Impulse Control  Outcome: Ongoing (interventions implemented as appropriate)    Problem: Infection, Risk/Actual (Adult)  Goal: Identify Related Risk Factors and Signs and Symptoms  Outcome: Outcome(s) achieved Date Met:  09/04/17  Goal: Infection Prevention/Resolution  Outcome: Ongoing (interventions implemented as appropriate)

## 2017-09-04 NOTE — PROGRESS NOTES
Pharmacy Consult    Patient was switched from sertraline 200mg to escitalopram 20mg (time frame unknown). Per consult notes, 1 month later was having 1 week history of restless, insomnia, shaking/myoclonus.  Escitalopram was stopped in ER 1 week ago and psychiatry started patient back on sertraline 3 days ago.    Without knowing more extensive patient medication and psychiatric history, it is difficult to  this patient's current situation. It is recommended that sertraline be slowly tapered off, and I do not know if/how this was done.    Would recommend holding sertraline for now and waiting to see psychiatry recommendations.    Thank you for consult,    Cheri Nolan, PharmD  9/4/2017  9:33 AM

## 2017-09-05 ENCOUNTER — APPOINTMENT (OUTPATIENT)
Dept: NEUROLOGY | Facility: HOSPITAL | Age: 57
End: 2017-09-05
Attending: PSYCHIATRY & NEUROLOGY

## 2017-09-05 ENCOUNTER — APPOINTMENT (OUTPATIENT)
Dept: MRI IMAGING | Facility: HOSPITAL | Age: 57
End: 2017-09-05
Attending: PSYCHIATRY & NEUROLOGY

## 2017-09-05 LAB
ANION GAP SERPL CALCULATED.3IONS-SCNC: 13.9 MMOL/L
BASOPHILS # BLD AUTO: 0.03 10*3/MM3 (ref 0–0.2)
BASOPHILS NFR BLD AUTO: 0.7 % (ref 0–1.5)
BUN BLD-MCNC: 14 MG/DL (ref 6–20)
BUN/CREAT SERPL: 13.7 (ref 7–25)
CALCIUM SPEC-SCNC: 8.5 MG/DL (ref 8.6–10.5)
CHLORIDE SERPL-SCNC: 102 MMOL/L (ref 98–107)
CO2 SERPL-SCNC: 27.1 MMOL/L (ref 22–29)
CORTIS SERPL-MCNC: 2.76 MCG/DL
CREAT BLD-MCNC: 1.02 MG/DL (ref 0.76–1.27)
DEPRECATED RDW RBC AUTO: 41.3 FL (ref 37–54)
EOSINOPHIL # BLD AUTO: 0.23 10*3/MM3 (ref 0–0.7)
EOSINOPHIL NFR BLD AUTO: 5 % (ref 0.3–6.2)
ERYTHROCYTE [DISTWIDTH] IN BLOOD BY AUTOMATED COUNT: 13.2 % (ref 11.5–14.5)
FOLATE BLD-MCNC: 329.7 NG/ML
FOLATE RBC-MCNC: 886 NG/ML
GFR SERPL CREATININE-BSD FRML MDRD: 76 ML/MIN/1.73
GLUCOSE BLD-MCNC: 108 MG/DL (ref 65–99)
HCT VFR BLD AUTO: 37.2 % (ref 37.5–51)
HCT VFR BLD AUTO: 37.5 % (ref 40.4–52.2)
HGB BLD-MCNC: 12 G/DL (ref 13.7–17.6)
IMM GRANULOCYTES # BLD: 0 10*3/MM3 (ref 0–0.03)
IMM GRANULOCYTES NFR BLD: 0 % (ref 0–0.5)
LYMPHOCYTES # BLD AUTO: 1.48 10*3/MM3 (ref 0.9–4.8)
LYMPHOCYTES NFR BLD AUTO: 32.5 % (ref 19.6–45.3)
MCH RBC QN AUTO: 27.5 PG (ref 27–32.7)
MCHC RBC AUTO-ENTMCNC: 32 G/DL (ref 32.6–36.4)
MCV RBC AUTO: 85.8 FL (ref 79.8–96.2)
MONOCYTES # BLD AUTO: 0.51 10*3/MM3 (ref 0.2–1.2)
MONOCYTES NFR BLD AUTO: 11.2 % (ref 5–12)
NEUTROPHILS # BLD AUTO: 2.31 10*3/MM3 (ref 1.9–8.1)
NEUTROPHILS NFR BLD AUTO: 50.6 % (ref 42.7–76)
PLATELET # BLD AUTO: 217 10*3/MM3 (ref 140–500)
PMV BLD AUTO: 9.1 FL (ref 6–12)
POTASSIUM BLD-SCNC: 4 MMOL/L (ref 3.5–5.2)
RBC # BLD AUTO: 4.37 10*6/MM3 (ref 4.6–6)
RPR SER QL: NORMAL
SODIUM BLD-SCNC: 143 MMOL/L (ref 136–145)
VIT B12 BLD-MCNC: >2000 PG/ML (ref 211–946)
WBC NRBC COR # BLD: 4.56 10*3/MM3 (ref 4.5–10.7)

## 2017-09-05 PROCEDURE — 95816 EEG AWAKE AND DROWSY: CPT | Performed by: PSYCHIATRY & NEUROLOGY

## 2017-09-05 PROCEDURE — 25810000003 SODIUM CHLORIDE 0.9 % WITH KCL 20 MEQ 20-0.9 MEQ/L-% SOLUTION: Performed by: INTERNAL MEDICINE

## 2017-09-05 PROCEDURE — 99232 SBSQ HOSP IP/OBS MODERATE 35: CPT | Performed by: NURSE PRACTITIONER

## 2017-09-05 PROCEDURE — 95816 EEG AWAKE AND DROWSY: CPT

## 2017-09-05 PROCEDURE — 80048 BASIC METABOLIC PNL TOTAL CA: CPT | Performed by: INTERNAL MEDICINE

## 2017-09-05 PROCEDURE — 25010000002 ENOXAPARIN PER 10 MG: Performed by: INTERNAL MEDICINE

## 2017-09-05 PROCEDURE — 70553 MRI BRAIN STEM W/O & W/DYE: CPT

## 2017-09-05 PROCEDURE — 25010000002 THIAMINE PER 100 MG: Performed by: INTERNAL MEDICINE

## 2017-09-05 PROCEDURE — 82607 VITAMIN B-12: CPT | Performed by: INTERNAL MEDICINE

## 2017-09-05 PROCEDURE — 82533 TOTAL CORTISOL: CPT | Performed by: INTERNAL MEDICINE

## 2017-09-05 PROCEDURE — 0 GADOBENATE DIMEGLUMINE 529 MG/ML SOLUTION: Performed by: INTERNAL MEDICINE

## 2017-09-05 PROCEDURE — 90791 PSYCH DIAGNOSTIC EVALUATION: CPT

## 2017-09-05 PROCEDURE — A9577 INJ MULTIHANCE: HCPCS | Performed by: INTERNAL MEDICINE

## 2017-09-05 PROCEDURE — 85025 COMPLETE CBC W/AUTO DIFF WBC: CPT | Performed by: INTERNAL MEDICINE

## 2017-09-05 RX ORDER — TRAZODONE HYDROCHLORIDE 100 MG/1
100 TABLET ORAL NIGHTLY
Status: DISCONTINUED | OUTPATIENT
Start: 2017-09-05 | End: 2017-09-06 | Stop reason: HOSPADM

## 2017-09-05 RX ADMIN — OXYCODONE HYDROCHLORIDE 30 MG: 15 TABLET ORAL at 15:38

## 2017-09-05 RX ADMIN — THIAMINE HYDROCHLORIDE 100 MG: 100 INJECTION, SOLUTION INTRAMUSCULAR; INTRAVENOUS at 09:00

## 2017-09-05 RX ADMIN — TRAZODONE HYDROCHLORIDE 100 MG: 100 TABLET, FILM COATED ORAL at 21:05

## 2017-09-05 RX ADMIN — POTASSIUM CHLORIDE AND SODIUM CHLORIDE 125 ML/HR: 900; 150 INJECTION, SOLUTION INTRAVENOUS at 06:58

## 2017-09-05 RX ADMIN — GADOBENATE DIMEGLUMINE 20 ML: 529 INJECTION, SOLUTION INTRAVENOUS at 11:02

## 2017-09-05 RX ADMIN — OXYCODONE HYDROCHLORIDE 30 MG: 15 TABLET ORAL at 21:38

## 2017-09-05 RX ADMIN — LORAZEPAM 2 MG: 1 TABLET ORAL at 22:51

## 2017-09-05 RX ADMIN — ACETAMINOPHEN 650 MG: 325 TABLET ORAL at 02:18

## 2017-09-05 RX ADMIN — LORAZEPAM 2 MG: 1 TABLET ORAL at 10:08

## 2017-09-05 RX ADMIN — OXYCODONE HYDROCHLORIDE 30 MG: 15 TABLET ORAL at 09:02

## 2017-09-05 RX ADMIN — CYPROHEPTADINE HYDROCHLORIDE 8 MG: 4 TABLET ORAL at 09:02

## 2017-09-05 RX ADMIN — ENOXAPARIN SODIUM 40 MG: 40 INJECTION SUBCUTANEOUS at 21:05

## 2017-09-05 NOTE — PROGRESS NOTES
Discharge Planning Assessment  [facility]     Patient Name: German Sotelo  MRN: [id number]  Today's Date: 9/5/2017    Admit Date: 9/4/2017          Discharge Needs Assessment       09/05/17 1439    Living Environment    Lives With spouse    Living Arrangements house    Home Accessibility bed and bath on same level;stairs within home    Stair Railings at Home none    Type of Financial/Environmental Concern none    Transportation Available family or friend will provide;car    Living Environment    Provides Primary Care For no one    Primary Care Provided By spouse/significant other    Quality Of Family Relationships supportive;helpful    Able to Return to Prior Living Arrangements yes    Discharge Needs Assessment    Concerns To Be Addressed no discharge needs identified    Readmission Within The Last 30 Days no previous admission in last 30 days    Anticipated Changes Related to Illness none    Equipment Currently Used at Home none    Equipment Needed After Discharge none    Discharge Disposition still a patient            Discharge Plan       09/05/17 1432    Case Management/Social Work Plan    Plan Home with no needs Identified    Additional Comments Spoke with patient at bedside. I introduced myself and explained CCP role.  Verified face sheet and confirmed that patient uses [facility] at [place] or [facility] in [place].  Patient lives in a one story house with his wife .   Pt is independent with ADL's.  He uses no DME.. Patient reports no history of  Home Health or rehab stays.   No other needs at this time.  CCP following          Discharge Placement     No information found                Demographic Summary     None            Functional Status       09/05/17 1438    Functional Status Current    Ambulation 0-->independent    Transferring 0-->independent    Toileting 0-->independent    Bathing 0-->independent    Dressing 0-->independent    Eating 0-->independent    Communication  0-->understands/communicates without difficulty    Swallowing (if score 2 or more for any item, consult Rehab Services) 0-->swallows foods/liquids without difficulty    Change in Functional Status Since Onset of Current Illness/Injury no    Functional Status Prior    Ambulation 0-->independent    Transferring 0-->independent    Toileting 0-->independent    Bathing 0-->independent    Dressing 0-->independent    Eating 0-->independent    Communication 0-->understands/communicates without difficulty    Swallowing 0-->swallows foods/liquids without difficulty            Psychosocial     None            Abuse/Neglect     None            Legal     None            Substance Abuse     None            Patient Forms     None          Doris Montejo, [facility]

## 2017-09-05 NOTE — PLAN OF CARE
Problem: Patient Care Overview (Adult)  Goal: Plan of Care Review  Outcome: Ongoing (interventions implemented as appropriate)    09/05/17 6175   Coping/Psychosocial Response Interventions   Plan Of Care Reviewed With patient   Patient Care Overview   Progress improving   Outcome Evaluation   Outcome Summary/Follow up Plan poss dc tomorrow. cont to monitor behavior after dc of periactin.

## 2017-09-05 NOTE — CONSULTS
"Pt interviewed with wife in Rm 444.  Dr Segura requested that I see pt for next steps.      Pt began symptoms of extreme restlessness, unable to sleep on 8/26/17.  He states he slept well last night.  He is calm today, reports depression as a \"4\" on 1-10 scale and anxiety as a \"3\" on 1-10 scale.  He denies SI/HI, denies any symptoms of psychosis.      We discussed levels of care including psych IOP and individual counseling.  He is considering these options, wanted to talk with wife about these options.  I will return to room to see what pt wants to do after this admission.      14:58 - Met again with pt, wife.  They are open to idea of psych IOP, pt voices some concern over length of program (3.5 hours/day and 5d/week)  Will give letter of disposition today for instructions if wants to begin psych IOP on Thursday, Sept 7th.    "

## 2017-09-05 NOTE — PROGRESS NOTES
Name: German Sotelo ADMIT: 2017   : 1960  PCP: Umm Roy MD    MRN: [id number] LOS: 1 days   AGE/SEX: 57 y.o. male  ROOM: [facility]   Subjective   Subjective  CC: anxiety  No acute events.  Patient slept for a few hours last PM. Still a bit anxious but this is much improved.  Taking PO well, no n/v/d.  Had MRI and EEG.    Objective   Vital Signs  Temp:  [97.6 °F (36.4 °C)-98.6 °F (37 °C)] 97.6 °F (36.4 °C)  Heart Rate:  [54-64] 58  Resp:  [20] 20  BP: (114-124)/(77-85) 116/77  SpO2:  [97 %-99 %] 97 %  on   ;   O2 Device: room air  There is no height or weight on file to calculate BMI.    Physical Exam  General: Alert, no distress  Head: NCAT  Eyes: EOMI, conjunctivae nml  Mouth/Throat:Oropharynx clear and moist  Neck: Supple, no jvd  Cardiac:normal rate, regular rhythm  Respiratory: CTAB, good air movement  Abdomen: soft, non-tender, bowel sounds are normoactive  MSK: no deformity, no tenderness, no dependent edema  Extremities no cyanosis, peripheral pulses intact  Neuro: oriented x3, no gross deficits  Psych: appropriate mood and affect  Results Review:       I reviewed the patient's new clinical results.    Results from last 7 days  Lab Units 17  0922   WBC 10*3/mm3 4.56 5.55   HEMOGLOBIN g/dL 12.0* 12.5*   PLATELETS 10*3/mm3 217 228     Results from last 7 days  Lab Units 17  0922   SODIUM mmol/L 143 142   POTASSIUM mmol/L 4.0 3.9   CHLORIDE mmol/L 102 101   CO2 mmol/L 27.1 28.7   BUN mg/dL 14 13   CREATININE mg/dL 1.02 1.05   GLUCOSE mg/dL 108* 104*   Estimated Creatinine Clearance: 92.6 mL/min (by C-G formula based on Cr of 1.02).  Results from last 7 days  Lab Units 17  0922   CALCIUM mg/dL 8.5* 9.7   ALBUMIN g/dL  --  4.30   MAGNESIUM mg/dL  --  2.1   PHOSPHORUS mg/dL  --  3.1         cyproheptadine 8 mg Oral TID   enoxaparin 40 mg Subcutaneous Nightly   thiamine (VITAMIN B1) IVPB 100 mg Intravenous Daily   traZODone 100 mg Oral  Nightly       sodium chloride 0.9 % with KCl 20 mEq 125 mL/hr Last Rate: 125 mL/hr (09/05/17 0658)   Diet Regular; Cardiac      Assessment/Plan   Assessment:     Active Hospital Problems (** Indicates Principal Problem)    Diagnosis Date Noted   • **Bonita [F30.9] 09/04/2017   • Insomnia [G47.00] 09/04/2017   • Depression [F32.9] 09/04/2017   • Anxiety [F41.9] 09/04/2017   • Chronic bilateral low back pain [M54.5, G89.29] 09/04/2017   • Manic behavior [F30.10] 09/04/2017   • Bipolar 1 disorder with moderate bonita [F31.12] 09/04/2017      Resolved Hospital Problems    Diagnosis Date Noted Date Resolved   No resolved problems to display.       Plan:   Insomnia/Anxiety  -no organic cause identified on labs or MRI  -eeg pending but I do not expect to find much given history and his rapid improvement  -this is likely a hypomanic episode-I doubt serotonin syndrome  -stop ciproheptadine  [facility]  -[facility] center consulted as he may need inpatient psych    Chronic Lower back pain  -on oxycodone IR, taking large doses   -has failed multiple pain pumps  -follows with outside pain management  -oxycodone PRN while here    DVT prophylaxis  -lovenox    Disposition  TBD.    Dexter Segura MD  [facility]  09/05/17  3:14 PM

## 2017-09-06 VITALS
TEMPERATURE: 98.3 F | SYSTOLIC BLOOD PRESSURE: 111 MMHG | DIASTOLIC BLOOD PRESSURE: 77 MMHG | HEART RATE: 62 BPM | RESPIRATION RATE: 20 BRPM | OXYGEN SATURATION: 94 % | HEIGHT: 70 IN

## 2017-09-06 PROBLEM — F31.12 BIPOLAR 1 DISORDER WITH MODERATE MANIA: Status: RESOLVED | Noted: 2017-09-04 | Resolved: 2017-09-06

## 2017-09-06 PROBLEM — F30.9 MANIA (HCC): Status: RESOLVED | Noted: 2017-09-04 | Resolved: 2017-09-06

## 2017-09-06 PROBLEM — G47.00 INSOMNIA: Status: RESOLVED | Noted: 2017-09-04 | Resolved: 2017-09-06

## 2017-09-06 PROBLEM — F30.10 MANIC BEHAVIOR (HCC): Status: RESOLVED | Noted: 2017-09-04 | Resolved: 2017-09-06

## 2017-09-06 LAB
1,25(OH)2D3 SERPL-MCNC: 30.8 PG/ML (ref 19.9–79.3)
ANION GAP SERPL CALCULATED.3IONS-SCNC: 13.1 MMOL/L
BASOPHILS # BLD AUTO: 0.03 10*3/MM3 (ref 0–0.2)
BASOPHILS NFR BLD AUTO: 0.7 % (ref 0–1.5)
BUN BLD-MCNC: 15 MG/DL (ref 6–20)
BUN/CREAT SERPL: 15.2 (ref 7–25)
CALCIUM SPEC-SCNC: 8.9 MG/DL (ref 8.6–10.5)
CHLORIDE SERPL-SCNC: 107 MMOL/L (ref 98–107)
CO2 SERPL-SCNC: 24.9 MMOL/L (ref 22–29)
CREAT BLD-MCNC: 0.99 MG/DL (ref 0.76–1.27)
DEPRECATED RDW RBC AUTO: 41.9 FL (ref 37–54)
EOSINOPHIL # BLD AUTO: 0.23 10*3/MM3 (ref 0–0.7)
EOSINOPHIL NFR BLD AUTO: 5.1 % (ref 0.3–6.2)
ERYTHROCYTE [DISTWIDTH] IN BLOOD BY AUTOMATED COUNT: 13.4 % (ref 11.5–14.5)
GFR SERPL CREATININE-BSD FRML MDRD: 78 ML/MIN/1.73
GLUCOSE BLD-MCNC: 143 MG/DL (ref 65–99)
HCT VFR BLD AUTO: 37 % (ref 40.4–52.2)
HGB BLD-MCNC: 11.8 G/DL (ref 13.7–17.6)
IMM GRANULOCYTES # BLD: 0 10*3/MM3 (ref 0–0.03)
IMM GRANULOCYTES NFR BLD: 0 % (ref 0–0.5)
LYMPHOCYTES # BLD AUTO: 1.6 10*3/MM3 (ref 0.9–4.8)
LYMPHOCYTES NFR BLD AUTO: 35.7 % (ref 19.6–45.3)
MCH RBC QN AUTO: 27.3 PG (ref 27–32.7)
MCHC RBC AUTO-ENTMCNC: 31.9 G/DL (ref 32.6–36.4)
MCV RBC AUTO: 85.6 FL (ref 79.8–96.2)
MONOCYTES # BLD AUTO: 0.42 10*3/MM3 (ref 0.2–1.2)
MONOCYTES NFR BLD AUTO: 9.4 % (ref 5–12)
NEUTROPHILS # BLD AUTO: 2.2 10*3/MM3 (ref 1.9–8.1)
NEUTROPHILS NFR BLD AUTO: 49.1 % (ref 42.7–76)
PLATELET # BLD AUTO: 215 10*3/MM3 (ref 140–500)
PMV BLD AUTO: 9.2 FL (ref 6–12)
POTASSIUM BLD-SCNC: 3.8 MMOL/L (ref 3.5–5.2)
RBC # BLD AUTO: 4.32 10*6/MM3 (ref 4.6–6)
SODIUM BLD-SCNC: 145 MMOL/L (ref 136–145)
WBC NRBC COR # BLD: 4.48 10*3/MM3 (ref 4.5–10.7)

## 2017-09-06 PROCEDURE — 80048 BASIC METABOLIC PNL TOTAL CA: CPT | Performed by: INTERNAL MEDICINE

## 2017-09-06 PROCEDURE — 85025 COMPLETE CBC W/AUTO DIFF WBC: CPT | Performed by: INTERNAL MEDICINE

## 2017-09-06 PROCEDURE — 25010000002 THIAMINE PER 100 MG: Performed by: INTERNAL MEDICINE

## 2017-09-06 RX ADMIN — OXYCODONE HYDROCHLORIDE 30 MG: 15 TABLET ORAL at 08:56

## 2017-09-06 RX ADMIN — THIAMINE HYDROCHLORIDE 100 MG: 100 INJECTION, SOLUTION INTRAMUSCULAR; INTRAVENOUS at 08:57

## 2017-09-06 RX ADMIN — OXYCODONE HYDROCHLORIDE 30 MG: 15 TABLET ORAL at 05:49

## 2017-09-06 NOTE — DISCHARGE SUMMARY
Date of Admission: 9/4/2017  Date of Discharge:  9/6/2017  Primary Care Physician: Umm Roy MD     Discharge Diagnosis:  Active Hospital Problems (** Indicates Principal Problem)    Diagnosis Date Noted   • Depression [F32.9] 09/04/2017   • Anxiety [F41.9] 09/04/2017   • Chronic bilateral low back pain [M54.5, G89.29] 09/04/2017      Resolved Hospital Problems    Diagnosis Date Noted Date Resolved   • **Bonita [F30.9] 09/04/2017 09/06/2017   • Insomnia [G47.00] 09/04/2017 09/06/2017   • Manic behavior [F30.10] 09/04/2017 09/06/2017   • Bipolar 1 disorder with moderate bonita [F31.12] 09/04/2017 09/06/2017       Presenting Problem/History of Present Illness:  Manic behavior [F30.10]     Hospital Course:  The patient is a 57 y.o. male with a history of serotonin syndrome about 5y ago along with depression and anxiety who presented with heightened mood, irritability, restlessness, and insomnia.  Please see admission H&P from 9/4/17 for further details.  The patient had previously been seen at [facility] a few days earlier and given cyproheptadine for possible serotonin syndrome.  On admission, the patient actually had normal vitals including blood pressure and heart rate as well as normal ECG.  He was worked up for organic causes with TSH, AM cortisol, B12, RPR, and tox screen-all of which were either normal or as expected (drug screen + for oxycodone).  Dr. Livingston with neurology and Dr. Jack with psychiatry were consulted.  He had a negative MRI and eeg.  He was started on a heightened dose of ativan at 2mg PO Q6H PRN which was quite effective.  His cyproheptadine was discontinued as this was not felt to have been serotonin syndrome-he was observed for 24h off of this medication.  The patient began sleeping regularly and his symptoms resolved.  This was thought to have been a manic episode.  He was discharged on his previous medication regimen with IOP follow-up.  He soul keep close follow up with  "his psychiatrist, Dr. Del Cid, for ongoing treatment and medication adjustment.     Exam Today:  Blood pressure 111/77, pulse 62, temperature 98.3 °F (36.8 °C), temperature source Oral, resp. rate 20, height 70\" (177.8 cm), SpO2 94 %.  General: Alert, no distress  Head: NCAT  Eyes: EOMI, conjunctivae nml  Mouth/Throat:Oropharynx clear and moist  Neck: Supple, no jvd  Cardiac:normal rate, regular rhythm  Respiratory: CTAB, good air movement  Abdomen: soft, non-tender, bowel sounds are normoactive  MSK: no deformity, no tenderness, no dependent edema  Extremities no cyanosis, peripheral pulses intact  Neuro: oriented x3, no gross deficits  Psych: appropriate mood and affect    Procedures Performed:  MRI OF THE BRAIN WITH AND WITHOUT CONTRAST      CLINICAL HISTORY:  Sudden onset of confusion and anxiety.      MRI of the brain was obtained with sagittal T1, axial pre and  postgadolinium T1, coronal postgadolinium T1, axial FLAIR, axial T2,  axial diffusion, and axial gradient echo images.      Comparison is made to prior CT scan of the head dated 09/04/2017.      FINDINGS:      There are no abnormal areas of restricted diffusion. The ventricles,  sulci, and cisterns are age appropriate. No significant white matter  abnormalities are identified. There are no abnormal areas of  susceptibility artifact. The major intracranial flow related signal  voids are unremarkable.       There are no abnormal areas of contrast enhancement. The midline  intracranial anatomy is within normal limits.      Incidentally noted is some fluid signal intensity within the left  mastoid air cells. Minor mucosal thickening is seen within the ethmoid  air cells.      IMPRESSION:      No evidence for acute intracranial pathology.      This report was finalized on 9/6/2017 5:24 AM by Dr. Ken Yeung MD.     EEG   Status:  Final result   Visible to patient:  No (Not Released) Order: [id number]     Details      Reading Physician Reading Date Result " Priority     Dariusz Huitron MD 9/5/2017 Routine         Narrative & Impression           EEG Report          #:      Indication: History of serotonin syndrome.  Patient very nervous     History: 57-year-old with history of serotonin syndrome.  Patient is very nervous with bipolar 1 with moderate shelly, insomnia depression and anxiety     Medical diagnoses: See above     .@MED@     Time of study: 20 minutes and 29 seconds     Technical summary:                         Background: 8-9 Hz low to moderate amplitude fairly well sustained and well regulated posteriorly dominant alpha rhythm.  Faster 15-20 Hz beta activities are seen bifrontally.  There is virtually continuous patient movement.  The technician attempted to minimize the electrode artifacts.                            Sleep: The patient was only transiently drowsy as noted by some attenuation of the alpha rhythm and some superimposed slower activities were seen.  No vertex sharp transients or sleep spindles were seen.                            Hyperventilation: Not obtained                            Photic stimulation: Not obtained                            EKG: Sinus rhythm with rates of around 60-80     Impression:  Normal EEG during wakefulness with only transient drowsiness.  No focal or epileptiform activities were seen.             Consults:   Consults     Date and Time Order Name Status Description    9/4/2017 0904 Inpatient Consult to Psychiatrist Completed     9/4/2017 0904 Inpatient Consult to Neurology Completed            Discharge Disposition:  Home or Self Care    Discharge Medications:   German Sotelo   Home Medication Instructions ANGELICA:[id number]    Printed on:09/06/17 0994   Medication Information                      LORazepam (ATIVAN) 1 MG tablet  Take 1 mg by mouth 2 (Two) Times a Day As Needed for Anxiety.             oxyCODONE (ROXICODONE) 30 MG immediate release tablet  every 4 (four) hours as needed              sertraline  (ZOLOFT) 100 MG tablet  Take 100 mg by mouth Daily.             traZODone (DESYREL) 100 MG tablet  Take 200 mg by mouth Every Night.                 Discharge Diet:   Diet Instructions     Diet: Regular; Thin       Discharge Diet:  Regular   Fluid Consistency:  Thin                 Activity at Discharge:   Activity Instructions     Activity as Tolerated                     Follow-up Appointments:  Future Appointments  Date Time Provider Department Center   9/7/2017 8:30 AM PHP [facility]   9/8/2017 8:30 AM PHP [facility]   9/11/2017 8:30 AM PHP [facility]   9/12/2017 8:30 AM [facility]   9/13/2017 8:30 AM [facility]   9/14/2017 8:30 AM [facility]   9/15/2017 8:30 AM [facility]   9/18/2017 8:30 AM [facility]   9/19/2017 8:30 AM [facility]   9/20/2017 8:30 AM [facility]   9/21/2017 8:30 AM [facility]   9/22/2017 8:30 AM PHP [facility]   9/25/2017 8:30 AM [facility]   9/26/2017 8:30 AM [facility]   9/27/2017 8:30 AM PHP [facility]   9/28/2017 8:30 AM [facility]   9/29/2017 8:30 AM [facility]   10/2/2017 8:30 AM [facility]   10/3/2017 8:30 AM [facility]   10/4/2017 8:30 AM [facility]   10/5/2017 8:30 AM PHP [facility]     Additional Instructions for the Follow-ups that You Need to Schedule     Discharge Follow-Up With Specified Provider    As directed    To:  Dr. Del Cid (Psychiatry)   Follow Up:  1 Week   Follow Up Details:  or as scheduled if sooner                 Test Results Pending  at Discharge:   Order Current Status    Vitamin D 1,25 Dihydroxy In process           Dexter Segura MD  09/06/17  9:52 AM    Time Spent on Discharge Activities: Greater than 30 minutes.

## 2017-09-06 NOTE — PLAN OF CARE
Problem: Patient Care Overview (Adult)  Goal: Plan of Care Review  Outcome: Ongoing (interventions implemented as appropriate)    09/06/17 0643   Coping/Psychosocial Response Interventions   Plan Of Care Reviewed With patient   Patient Care Overview   Progress improving   Outcome Evaluation   Outcome Summary/Follow up Plan VSS. PO ATIVAN X 1 AT HS AND SLEPT AT LONG INTERVALS.         Problem: Manic Behavior/Episode (Adult)  Goal: Mood Equilibrium  Outcome: Ongoing (interventions implemented as appropriate)    Problem: Infection, Risk/Actual (Adult)  Goal: Infection Prevention/Resolution  Outcome: Ongoing (interventions implemented as appropriate)

## 2017-09-06 NOTE — PROGRESS NOTES
Met w/ patient and wife.  He anticipates discharge and plans on starting the IOP tomorrow. Ms Yang provided the information re: admission.  Patient provided new flyer for the IOP stating where he needs to arrive tomorrow morning.

## 2017-09-07 ENCOUNTER — APPOINTMENT (OUTPATIENT)
Dept: PSYCHIATRY | Facility: HOSPITAL | Age: 57
End: 2017-09-07

## 2017-09-08 ENCOUNTER — APPOINTMENT (OUTPATIENT)
Dept: PSYCHIATRY | Facility: HOSPITAL | Age: 57
End: 2017-09-08

## 2017-09-08 ENCOUNTER — OFFICE VISIT (OUTPATIENT)
Dept: PSYCHIATRY | Facility: HOSPITAL | Age: 57
End: 2017-09-08

## 2017-09-08 DIAGNOSIS — F33.1 MODERATE EPISODE OF RECURRENT MAJOR DEPRESSIVE DISORDER (HCC): Primary | ICD-10-CM

## 2017-09-08 PROCEDURE — S9480 INTENSIVE OUTPATIENT PSYCHIA: HCPCS | Performed by: COUNSELOR

## 2017-09-08 NOTE — PROGRESS NOTES
IOP  Group note   Observations:    Engaged in Activity / Process and Self -disclosed: Yes  Applies Topic to self: Yes  Able to give Constructive Feedback: Yes  Affect: sad  Degree of Insightful Thinking 5  Notes:  Shared of problem with Type A personality and all-or-nothing thinking.  Accomplished all small goals yesterday and more.   Some time to relax and read.   NO SI      Vannesa Worthington, [facility]

## 2017-09-08 NOTE — PROGRESS NOTES
[id number] Psych IOP class     Class topic: passive/aggressive/assertive communication and behavior     Class participation: good

## 2017-09-08 NOTE — PROGRESS NOTES
Does not persuade in the intensive outpatient program following his period of hospitalization.  It remains unclear what the etiology of the patient's symptoms in the hospital were, but I can do believe that the patient may have had a hypomanic episode.  The patient is currently holding psychotropic medications apart from when necessary Ativan and trazodone and will see Dr. Del Cid on Wednesday.  He denies any suicidal thinking today.  He exhibits no signs of confusion or shelly.

## 2017-09-08 NOTE — PROGRESS NOTES
Wrap-up  Day 2 IOP  Mood at  with 10 being the worse    Changes in normal sleep patterns ( decrease, or broken hoours of sleep)  NO SI  Good participation in classes and groups  Goals for later today:  Short walk and read.   Weekend plans to go to grandchildren functions.  Saw MD today  Positive attitude

## 2017-09-11 ENCOUNTER — APPOINTMENT (OUTPATIENT)
Dept: PSYCHIATRY | Facility: HOSPITAL | Age: 57
End: 2017-09-11

## 2017-09-11 ENCOUNTER — OFFICE VISIT (OUTPATIENT)
Dept: PSYCHIATRY | Facility: HOSPITAL | Age: 57
End: 2017-09-11

## 2017-09-11 DIAGNOSIS — F33.1 MODERATE EPISODE OF RECURRENT MAJOR DEPRESSIVE DISORDER (HCC): Primary | ICD-10-CM

## 2017-09-11 PROCEDURE — S9480 INTENSIVE OUTPATIENT PSYCHIA: HCPCS | Performed by: COUNSELOR

## 2017-09-11 NOTE — PROGRESS NOTES
IOP  Group note   Observations:    Engaged in Activity / Process and Self -disclosed: Yes  Applies Topic to self: Yes  Able to give Constructive Feedback: Yes  Affect: flat  Degree of Insightful Thinking 5  Notes:  Fatigued today from lack of sleep last night.   Reported slept well Fri and Sat nights.  Able to walk, relax and watch football this weekend.   NO SI.  Positive attitude.  Able to give and receive feedback of peers.      [facility] Worthington [facility]

## 2017-09-11 NOTE — PROGRESS NOTES
Other     Information/activity     Hayder Dillard [place] [address]    Instructor Prakash Garcia     10:26 AM     Patient Response Good participation

## 2017-09-11 NOTE — PROGRESS NOTES
Patient is in good spirits today.  His only complaint is of some variable sleep which she expects to discuss with Dr. Del Cid at his appointment on Wednesday.  He denies any suicidal thinking at this time.

## 2017-09-11 NOTE — PROGRESS NOTES
Wrap-up  Day 3 IOP  Mood at 4  with 10 being the worse    Fatigue or loss of energy  Changes in normal sleep patterns ( decrease, or broken hoours of sleep)  NO SI  Described much caffeine consumption and was educated about the effects on the body.  Goals for later today:  Walk, yard work and not take a nap although very tired

## 2017-09-12 ENCOUNTER — APPOINTMENT (OUTPATIENT)
Dept: PSYCHIATRY | Facility: HOSPITAL | Age: 57
End: 2017-09-12

## 2017-09-13 ENCOUNTER — APPOINTMENT (OUTPATIENT)
Dept: PSYCHIATRY | Facility: HOSPITAL | Age: 57
End: 2017-09-13

## 2017-09-14 ENCOUNTER — APPOINTMENT (OUTPATIENT)
Dept: PSYCHIATRY | Facility: HOSPITAL | Age: 57
End: 2017-09-14

## 2017-09-14 ENCOUNTER — OFFICE VISIT (OUTPATIENT)
Dept: PSYCHIATRY | Facility: HOSPITAL | Age: 57
End: 2017-09-14

## 2017-09-14 DIAGNOSIS — F33.1 MODERATE EPISODE OF RECURRENT MAJOR DEPRESSIVE DISORDER (HCC): Primary | ICD-10-CM

## 2017-09-14 PROCEDURE — S9480 INTENSIVE OUTPATIENT PSYCHIA: HCPCS | Performed by: COUNSELOR

## 2017-09-14 NOTE — PROGRESS NOTES
Wrap-up  Day 4 IOP  Mood at 4  with 10 being the worse  Good participation in classes and groups    Fatigue  NO SI  Goals for later today:  Walk and no napping so can sleep better tonight.

## 2017-09-14 NOTE — PROGRESS NOTES
IOP  Group note   Observations:    Engaged in Activity / Process and Self -disclosed: {YES/NO:15400}  Applies Topic to self: {YES/NO:56970}  Able to give Constructive Feedback: {YES/NO:78865}  Affect: {affect:48935}  Degree of Insightful Thinking {AMB 1-10:0309628366}  Notes:  ***      [facility]

## 2017-09-14 NOTE — PROGRESS NOTES
Other     Information/activity     [employer]    Instructor Prakash Garcia     1:15 PM     Patient Response Good participation

## 2017-09-14 NOTE — PROGRESS NOTES
/IOP  Group note  Observations:    Engaged in Activity / Process and Self -disclosed: Yes  Applies Topic to self: Yes  Able to give Constructive Feedback: Yes  Affect: anxious  Degree of Insightful Thinking 6  Notes:  Patient talked about his struggle with chronic pain and all the losses due to his medical condition.  He shared his positive attitude about this, stating that if he had not lost everything, he would not have learned all the things that he has learned.  Supportive of other group members when they shared.

## 2017-09-15 ENCOUNTER — APPOINTMENT (OUTPATIENT)
Dept: PSYCHIATRY | Facility: HOSPITAL | Age: 57
End: 2017-09-15

## 2017-09-15 ENCOUNTER — OFFICE VISIT (OUTPATIENT)
Dept: PSYCHIATRY | Facility: HOSPITAL | Age: 57
End: 2017-09-15

## 2017-09-15 DIAGNOSIS — F33.1 MODERATE EPISODE OF RECURRENT MAJOR DEPRESSIVE DISORDER (HCC): Primary | ICD-10-CM

## 2017-09-15 PROCEDURE — S9480 INTENSIVE OUTPATIENT PSYCHIA: HCPCS | Performed by: COUNSELOR

## 2017-09-15 NOTE — PROGRESS NOTES
Wrap-up  Day 5 IOP  Mood at  4 with 10 being the worse    Fatigue or loss of energy  Broken sleep  NO SI  Bright affect  Good participation in classes and groups  Goals for weekend:  Watch UL and spend time with the family.

## 2017-09-15 NOTE — PROGRESS NOTES
Stress Management Class   Information/activity     9:00am-9:50am    Instructor     9:57 AM     Patient Response Good participation  Patient interested for his grandson as well as for himself-self soothing exercises at bedtime.

## 2017-09-15 NOTE — PROGRESS NOTES
IOP  Group note   Observations:    Engaged in Activity / Process and Self -disclosed: Yes  Applies Topic to self: Yes  Able to give Constructive Feedback: Yes  Affect: bright  Degree of Insightful Thinking 6  Notes:  Reported sleep remains somewhat decreased.  NO SI.  Wife will be gone this weekend but older grandsons will be around.  Pt has plans to watch [employer].   Good interaction both giving and receiving feedback.        Vannesa Worthington, [facility]

## 2017-09-18 ENCOUNTER — APPOINTMENT (OUTPATIENT)
Dept: PSYCHIATRY | Facility: HOSPITAL | Age: 57
End: 2017-09-18

## 2017-09-19 ENCOUNTER — APPOINTMENT (OUTPATIENT)
Dept: PSYCHIATRY | Facility: HOSPITAL | Age: 57
End: 2017-09-19

## 2017-09-20 ENCOUNTER — APPOINTMENT (OUTPATIENT)
Dept: PSYCHIATRY | Facility: HOSPITAL | Age: 57
End: 2017-09-20

## 2017-09-21 ENCOUNTER — APPOINTMENT (OUTPATIENT)
Dept: PSYCHIATRY | Facility: HOSPITAL | Age: 57
End: 2017-09-21

## 2017-09-22 ENCOUNTER — APPOINTMENT (OUTPATIENT)
Dept: PSYCHIATRY | Facility: HOSPITAL | Age: 57
End: 2017-09-22

## 2017-09-25 ENCOUNTER — APPOINTMENT (OUTPATIENT)
Dept: PSYCHIATRY | Facility: HOSPITAL | Age: 57
End: 2017-09-25

## 2017-09-26 ENCOUNTER — APPOINTMENT (OUTPATIENT)
Dept: PSYCHIATRY | Facility: HOSPITAL | Age: 57
End: 2017-09-26

## 2017-09-27 ENCOUNTER — APPOINTMENT (OUTPATIENT)
Dept: PSYCHIATRY | Facility: HOSPITAL | Age: 57
End: 2017-09-27

## 2017-09-28 ENCOUNTER — APPOINTMENT (OUTPATIENT)
Dept: PSYCHIATRY | Facility: HOSPITAL | Age: 57
End: 2017-09-28

## 2017-09-29 ENCOUNTER — APPOINTMENT (OUTPATIENT)
Dept: PSYCHIATRY | Facility: HOSPITAL | Age: 57
End: 2017-09-29

## 2017-10-02 ENCOUNTER — APPOINTMENT (OUTPATIENT)
Dept: PSYCHIATRY | Facility: HOSPITAL | Age: 57
End: 2017-10-02

## 2017-10-03 ENCOUNTER — APPOINTMENT (OUTPATIENT)
Dept: PSYCHIATRY | Facility: HOSPITAL | Age: 57
End: 2017-10-03

## 2017-10-04 ENCOUNTER — APPOINTMENT (OUTPATIENT)
Dept: PSYCHIATRY | Facility: HOSPITAL | Age: 57
End: 2017-10-04

## 2017-10-05 ENCOUNTER — APPOINTMENT (OUTPATIENT)
Dept: PSYCHIATRY | Facility: HOSPITAL | Age: 57
End: 2017-10-05

## 2017-11-08 ENCOUNTER — HOSPITAL ENCOUNTER (OUTPATIENT)
Dept: GENERAL RADIOLOGY | Facility: HOSPITAL | Age: 57
Discharge: HOME | End: 2017-11-08
Admitting: PHYSICIAN ASSISTANT

## 2017-11-08 DIAGNOSIS — M96.1 POSTLAMINECTOMY SYNDROME, LUMBAR REGION: ICD-10-CM

## 2017-11-08 DIAGNOSIS — M54.50 LUMBAGO: ICD-10-CM

## 2017-11-08 DIAGNOSIS — M54.17 LUMBOSACRAL NEURITIS: ICD-10-CM

## 2017-11-08 PROCEDURE — 72110 X-RAY EXAM L-2 SPINE 4/>VWS: CPT

## 2018-02-21 ENCOUNTER — DOCUMENTATION (OUTPATIENT)
Dept: INTERNAL MEDICINE | Facility: HOSPITAL | Age: 58
End: 2018-02-21

## 2018-05-14 ENCOUNTER — TRANSCRIBE ORDERS (OUTPATIENT)
Dept: ADMINISTRATIVE | Facility: HOSPITAL | Age: 58
End: 2018-05-14

## 2018-05-14 ENCOUNTER — HOSPITAL ENCOUNTER (OUTPATIENT)
Dept: CT IMAGING | Facility: HOSPITAL | Age: 58
Discharge: HOME | End: 2018-05-14
Admitting: INTERNAL MEDICINE

## 2018-05-14 DIAGNOSIS — R41.82 ALTERED MENTAL STATUS, UNSPECIFIED ALTERED MENTAL STATUS TYPE: ICD-10-CM

## 2018-05-14 DIAGNOSIS — R41.82 ALTERED MENTAL STATUS, UNSPECIFIED ALTERED MENTAL STATUS TYPE: Primary | ICD-10-CM

## 2018-05-14 PROCEDURE — 70450 CT HEAD/BRAIN W/O DYE: CPT

## 2019-11-25 ENCOUNTER — APPOINTMENT (OUTPATIENT)
Dept: GENERAL RADIOLOGY | Facility: HOSPITAL | Age: 59
End: 2019-11-25

## 2019-11-25 ENCOUNTER — ANESTHESIA EVENT (OUTPATIENT)
Dept: PERIOP | Facility: HOSPITAL | Age: 59
End: 2019-11-25

## 2019-11-25 ENCOUNTER — HOSPITAL ENCOUNTER (OUTPATIENT)
Facility: HOSPITAL | Age: 59
Setting detail: HOSPITAL OUTPATIENT SURGERY
Discharge: HOME | End: 2019-11-25
Attending: PAIN MEDICINE | Admitting: PAIN MEDICINE

## 2019-11-25 ENCOUNTER — ANESTHESIA (OUTPATIENT)
Dept: PERIOP | Facility: HOSPITAL | Age: 59
End: 2019-11-25

## 2019-11-25 VITALS
HEART RATE: 53 BPM | SYSTOLIC BLOOD PRESSURE: 152 MMHG | WEIGHT: 240.3 LBS | DIASTOLIC BLOOD PRESSURE: 88 MMHG | HEIGHT: 70 IN | RESPIRATION RATE: 18 BRPM | BODY MASS INDEX: 34.4 KG/M2 | TEMPERATURE: 97.4 F | OXYGEN SATURATION: 99 %

## 2019-11-25 PROBLEM — T85.610A MECHANICAL BREAKDOWN OF SUBDURAL INFUSION CATHETER: Status: ACTIVE | Noted: 2019-11-25

## 2019-11-25 PROBLEM — T85.610A MECHANICAL BREAKDOWN OF SUBDURAL INFUSION CATHETER: Status: RESOLVED | Noted: 2019-11-25 | Resolved: 2019-11-25

## 2019-11-25 PROCEDURE — 25010000002 ONDANSETRON PER 1 MG: Performed by: NURSE ANESTHETIST, CERTIFIED REGISTERED

## 2019-11-25 PROCEDURE — 25010000002 HYDROMORPHONE PER 4 MG: Performed by: NURSE ANESTHETIST, CERTIFIED REGISTERED

## 2019-11-25 PROCEDURE — 25010000002 NEOSTIGMINE PER 0.5 MG: Performed by: NURSE ANESTHETIST, CERTIFIED REGISTERED

## 2019-11-25 PROCEDURE — C1755 CATHETER, INTRASPINAL: HCPCS | Performed by: PAIN MEDICINE

## 2019-11-25 PROCEDURE — 76000 FLUOROSCOPY <1 HR PHYS/QHP: CPT

## 2019-11-25 PROCEDURE — 25010000002 MIDAZOLAM PER 1 MG: Performed by: ANESTHESIOLOGY

## 2019-11-25 PROCEDURE — 72020 X-RAY EXAM OF SPINE 1 VIEW: CPT

## 2019-11-25 PROCEDURE — 25010000002 FENTANYL CITRATE (PF) 100 MCG/2ML SOLUTION: Performed by: ANESTHESIOLOGY

## 2019-11-25 PROCEDURE — 25010000002 PROPOFOL 10 MG/ML EMULSION: Performed by: NURSE ANESTHETIST, CERTIFIED REGISTERED

## 2019-11-25 PROCEDURE — 25010000002 DEXAMETHASONE PER 1 MG: Performed by: NURSE ANESTHETIST, CERTIFIED REGISTERED

## 2019-11-25 PROCEDURE — 25010000002 VANCOMYCIN 1 G RECONSTITUTED SOLUTION

## 2019-11-25 DEVICE — THE INTRATHECAL CATHETER IS A STERILE, SINGLE-PIECE, RADIOPAQUE, SILICONE CATHETER WITH A PRE-INSERTED HYDROPHILIC STIFFENING STYLET THAT IS USED TO ASSIST IN PLACING THE CATHETER.
Type: IMPLANTABLE DEVICE | Status: FUNCTIONAL
Brand: PROMETRA INTRATHECAL CATHETER

## 2019-11-25 DEVICE — THE CATHETER ACCESS KIT IS STERILE AND PROVIDES THE COMPONENTS AND INSTRUCTIONS NECESSARY TO ACCESS THE CATHETER ACCESS PORT OF THE PROMETRA PROGRAMMABLE PUMP. THE CATHETER ACCESS PORT IS LOCATED ON THE PERIPHERY OF THE PUMP TO ALLOW FOR DIRECT ACCESS TO THE CATHETER WITHOUT INTERFERING WITH THE DRUG RESERVOIR.
Type: IMPLANTABLE DEVICE | Status: FUNCTIONAL
Brand: PROMETRA CATHETER ACCESS KIT

## 2019-11-25 RX ORDER — HYDROCODONE BITARTRATE AND ACETAMINOPHEN 7.5; 325 MG/1; MG/1
1 TABLET ORAL ONCE AS NEEDED
Status: DISCONTINUED | OUTPATIENT
Start: 2019-11-25 | End: 2019-11-25 | Stop reason: HOSPADM

## 2019-11-25 RX ORDER — PROMETHAZINE HYDROCHLORIDE 25 MG/ML
12.5 INJECTION, SOLUTION INTRAMUSCULAR; INTRAVENOUS ONCE AS NEEDED
Status: DISCONTINUED | OUTPATIENT
Start: 2019-11-25 | End: 2019-11-25 | Stop reason: HOSPADM

## 2019-11-25 RX ORDER — LIDOCAINE HYDROCHLORIDE 20 MG/ML
INJECTION, SOLUTION INFILTRATION; PERINEURAL AS NEEDED
Status: DISCONTINUED | OUTPATIENT
Start: 2019-11-25 | End: 2019-11-25 | Stop reason: SURG

## 2019-11-25 RX ORDER — SODIUM CHLORIDE 0.9 % (FLUSH) 0.9 %
3 SYRINGE (ML) INJECTION EVERY 12 HOURS SCHEDULED
Status: DISCONTINUED | OUTPATIENT
Start: 2019-11-25 | End: 2019-11-25 | Stop reason: HOSPADM

## 2019-11-25 RX ORDER — ONDANSETRON 2 MG/ML
4 INJECTION INTRAMUSCULAR; INTRAVENOUS ONCE AS NEEDED
Status: DISCONTINUED | OUTPATIENT
Start: 2019-11-25 | End: 2019-11-25 | Stop reason: HOSPADM

## 2019-11-25 RX ORDER — ACETAMINOPHEN 325 MG/1
650 TABLET ORAL ONCE AS NEEDED
Status: DISCONTINUED | OUTPATIENT
Start: 2019-11-25 | End: 2019-11-25 | Stop reason: HOSPADM

## 2019-11-25 RX ORDER — FENTANYL CITRATE 50 UG/ML
50 INJECTION, SOLUTION INTRAMUSCULAR; INTRAVENOUS
Status: DISCONTINUED | OUTPATIENT
Start: 2019-11-25 | End: 2019-11-25 | Stop reason: HOSPADM

## 2019-11-25 RX ORDER — MIDAZOLAM HYDROCHLORIDE 1 MG/ML
2 INJECTION INTRAMUSCULAR; INTRAVENOUS
Status: DISCONTINUED | OUTPATIENT
Start: 2019-11-25 | End: 2019-11-25 | Stop reason: HOSPADM

## 2019-11-25 RX ORDER — HYDRALAZINE HYDROCHLORIDE 20 MG/ML
5 INJECTION INTRAMUSCULAR; INTRAVENOUS
Status: DISCONTINUED | OUTPATIENT
Start: 2019-11-25 | End: 2019-11-25 | Stop reason: HOSPADM

## 2019-11-25 RX ORDER — SODIUM CHLORIDE, SODIUM LACTATE, POTASSIUM CHLORIDE, CALCIUM CHLORIDE 600; 310; 30; 20 MG/100ML; MG/100ML; MG/100ML; MG/100ML
9 INJECTION, SOLUTION INTRAVENOUS CONTINUOUS
Status: DISCONTINUED | OUTPATIENT
Start: 2019-11-25 | End: 2019-11-25 | Stop reason: HOSPADM

## 2019-11-25 RX ORDER — NALOXONE HCL 0.4 MG/ML
0.2 VIAL (ML) INJECTION AS NEEDED
Status: DISCONTINUED | OUTPATIENT
Start: 2019-11-25 | End: 2019-11-25 | Stop reason: HOSPADM

## 2019-11-25 RX ORDER — DIPHENHYDRAMINE HCL 25 MG
25 CAPSULE ORAL
Status: DISCONTINUED | OUTPATIENT
Start: 2019-11-25 | End: 2019-11-25 | Stop reason: HOSPADM

## 2019-11-25 RX ORDER — CLINDAMYCIN HYDROCHLORIDE 300 MG/1
300 CAPSULE ORAL 3 TIMES DAILY
Qty: 21 CAPSULE | Refills: 0 | Status: SHIPPED | OUTPATIENT
Start: 2019-11-25 | End: 2019-12-02

## 2019-11-25 RX ORDER — GLYCOPYRROLATE 0.2 MG/ML
INJECTION INTRAMUSCULAR; INTRAVENOUS AS NEEDED
Status: DISCONTINUED | OUTPATIENT
Start: 2019-11-25 | End: 2019-11-25 | Stop reason: SURG

## 2019-11-25 RX ORDER — PROMETHAZINE HYDROCHLORIDE 25 MG/1
25 SUPPOSITORY RECTAL ONCE AS NEEDED
Status: DISCONTINUED | OUTPATIENT
Start: 2019-11-25 | End: 2019-11-25 | Stop reason: HOSPADM

## 2019-11-25 RX ORDER — PROMETHAZINE HYDROCHLORIDE 25 MG/1
25 TABLET ORAL ONCE AS NEEDED
Status: DISCONTINUED | OUTPATIENT
Start: 2019-11-25 | End: 2019-11-25 | Stop reason: HOSPADM

## 2019-11-25 RX ORDER — PROPOFOL 10 MG/ML
VIAL (ML) INTRAVENOUS AS NEEDED
Status: DISCONTINUED | OUTPATIENT
Start: 2019-11-25 | End: 2019-11-25 | Stop reason: SURG

## 2019-11-25 RX ORDER — DEXAMETHASONE SODIUM PHOSPHATE 10 MG/ML
INJECTION INTRAMUSCULAR; INTRAVENOUS AS NEEDED
Status: DISCONTINUED | OUTPATIENT
Start: 2019-11-25 | End: 2019-11-25 | Stop reason: SURG

## 2019-11-25 RX ORDER — LIDOCAINE HYDROCHLORIDE 10 MG/ML
0.5 INJECTION, SOLUTION EPIDURAL; INFILTRATION; INTRACAUDAL; PERINEURAL ONCE AS NEEDED
Status: DISCONTINUED | OUTPATIENT
Start: 2019-11-25 | End: 2019-11-25 | Stop reason: HOSPADM

## 2019-11-25 RX ORDER — MIDAZOLAM HYDROCHLORIDE 1 MG/ML
1 INJECTION INTRAMUSCULAR; INTRAVENOUS
Status: DISCONTINUED | OUTPATIENT
Start: 2019-11-25 | End: 2019-11-25 | Stop reason: HOSPADM

## 2019-11-25 RX ORDER — ROCURONIUM BROMIDE 10 MG/ML
INJECTION, SOLUTION INTRAVENOUS AS NEEDED
Status: DISCONTINUED | OUTPATIENT
Start: 2019-11-25 | End: 2019-11-25 | Stop reason: SURG

## 2019-11-25 RX ORDER — OXYCODONE HYDROCHLORIDE AND ACETAMINOPHEN 5; 325 MG/1; MG/1
1 TABLET ORAL ONCE AS NEEDED
Status: DISCONTINUED | OUTPATIENT
Start: 2019-11-25 | End: 2019-11-25 | Stop reason: HOSPADM

## 2019-11-25 RX ORDER — ONDANSETRON 2 MG/ML
INJECTION INTRAMUSCULAR; INTRAVENOUS AS NEEDED
Status: DISCONTINUED | OUTPATIENT
Start: 2019-11-25 | End: 2019-11-25 | Stop reason: SURG

## 2019-11-25 RX ORDER — BUPIVACAINE HYDROCHLORIDE AND EPINEPHRINE 5; 5 MG/ML; UG/ML
INJECTION, SOLUTION PERINEURAL AS NEEDED
Status: DISCONTINUED | OUTPATIENT
Start: 2019-11-25 | End: 2019-11-25 | Stop reason: HOSPADM

## 2019-11-25 RX ORDER — PROMETHAZINE HYDROCHLORIDE 25 MG/ML
6.25 INJECTION, SOLUTION INTRAMUSCULAR; INTRAVENOUS
Status: DISCONTINUED | OUTPATIENT
Start: 2019-11-25 | End: 2019-11-25 | Stop reason: HOSPADM

## 2019-11-25 RX ORDER — FLUMAZENIL 0.1 MG/ML
0.2 INJECTION INTRAVENOUS AS NEEDED
Status: DISCONTINUED | OUTPATIENT
Start: 2019-11-25 | End: 2019-11-25 | Stop reason: HOSPADM

## 2019-11-25 RX ORDER — HYDROMORPHONE HYDROCHLORIDE 1 MG/ML
0.5 INJECTION, SOLUTION INTRAMUSCULAR; INTRAVENOUS; SUBCUTANEOUS
Status: DISCONTINUED | OUTPATIENT
Start: 2019-11-25 | End: 2019-11-25 | Stop reason: HOSPADM

## 2019-11-25 RX ORDER — GINSENG 100 MG
CAPSULE ORAL AS NEEDED
Status: DISCONTINUED | OUTPATIENT
Start: 2019-11-25 | End: 2019-11-25 | Stop reason: HOSPADM

## 2019-11-25 RX ORDER — MEPERIDINE HYDROCHLORIDE 25 MG/ML
12.5 INJECTION INTRAMUSCULAR; INTRAVENOUS; SUBCUTANEOUS
Status: DISCONTINUED | OUTPATIENT
Start: 2019-11-25 | End: 2019-11-25 | Stop reason: HOSPADM

## 2019-11-25 RX ORDER — OXYCODONE HYDROCHLORIDE AND ACETAMINOPHEN 5; 325 MG/1; MG/1
1 TABLET ORAL EVERY 4 HOURS PRN
Qty: 30 TABLET | Refills: 0 | Status: SHIPPED | OUTPATIENT
Start: 2019-11-25

## 2019-11-25 RX ORDER — OXYCODONE AND ACETAMINOPHEN 7.5; 325 MG/1; MG/1
1 TABLET ORAL ONCE AS NEEDED
Status: COMPLETED | OUTPATIENT
Start: 2019-11-25 | End: 2019-11-25

## 2019-11-25 RX ORDER — CLINDAMYCIN PHOSPHATE 900 MG/50ML
900 INJECTION INTRAVENOUS
Status: COMPLETED | OUTPATIENT
Start: 2019-11-25 | End: 2019-11-25

## 2019-11-25 RX ORDER — FAMOTIDINE 10 MG/ML
20 INJECTION, SOLUTION INTRAVENOUS ONCE
Status: COMPLETED | OUTPATIENT
Start: 2019-11-25 | End: 2019-11-25

## 2019-11-25 RX ORDER — EPHEDRINE SULFATE 50 MG/ML
5 INJECTION, SOLUTION INTRAVENOUS ONCE AS NEEDED
Status: DISCONTINUED | OUTPATIENT
Start: 2019-11-25 | End: 2019-11-25 | Stop reason: HOSPADM

## 2019-11-25 RX ORDER — DIPHENHYDRAMINE HYDROCHLORIDE 50 MG/ML
12.5 INJECTION INTRAMUSCULAR; INTRAVENOUS
Status: DISCONTINUED | OUTPATIENT
Start: 2019-11-25 | End: 2019-11-25 | Stop reason: HOSPADM

## 2019-11-25 RX ORDER — SODIUM CHLORIDE 0.9 % (FLUSH) 0.9 %
3-10 SYRINGE (ML) INJECTION AS NEEDED
Status: DISCONTINUED | OUTPATIENT
Start: 2019-11-25 | End: 2019-11-25 | Stop reason: HOSPADM

## 2019-11-25 RX ADMIN — FENTANYL CITRATE 100 MCG: 50 INJECTION INTRAMUSCULAR; INTRAVENOUS at 16:16

## 2019-11-25 RX ADMIN — ROCURONIUM BROMIDE 50 MG: 10 INJECTION INTRAVENOUS at 16:16

## 2019-11-25 RX ADMIN — MIDAZOLAM 1 MG: 1 INJECTION INTRAMUSCULAR; INTRAVENOUS at 15:12

## 2019-11-25 RX ADMIN — HYDROMORPHONE HYDROCHLORIDE 0.5 MG: 1 INJECTION, SOLUTION INTRAMUSCULAR; INTRAVENOUS; SUBCUTANEOUS at 18:20

## 2019-11-25 RX ADMIN — FENTANYL CITRATE 50 MCG: 50 INJECTION INTRAMUSCULAR; INTRAVENOUS at 15:12

## 2019-11-25 RX ADMIN — SODIUM CHLORIDE, POTASSIUM CHLORIDE, SODIUM LACTATE AND CALCIUM CHLORIDE: 600; 310; 30; 20 INJECTION, SOLUTION INTRAVENOUS at 17:43

## 2019-11-25 RX ADMIN — SODIUM CHLORIDE, POTASSIUM CHLORIDE, SODIUM LACTATE AND CALCIUM CHLORIDE 9 ML/HR: 600; 310; 30; 20 INJECTION, SOLUTION INTRAVENOUS at 15:13

## 2019-11-25 RX ADMIN — LIDOCAINE HYDROCHLORIDE 60 MG: 20 INJECTION, SOLUTION INFILTRATION; PERINEURAL at 16:16

## 2019-11-25 RX ADMIN — FAMOTIDINE 20 MG: 10 INJECTION INTRAVENOUS at 15:12

## 2019-11-25 RX ADMIN — NEOSTIGMINE METHYLSULFATE 4 MG: 1 INJECTION INTRAMUSCULAR; INTRAVENOUS; SUBCUTANEOUS at 17:52

## 2019-11-25 RX ADMIN — PROPOFOL 200 MG: 10 INJECTION, EMULSION INTRAVENOUS at 16:16

## 2019-11-25 RX ADMIN — ONDANSETRON 4 MG: 2 INJECTION INTRAMUSCULAR; INTRAVENOUS at 16:35

## 2019-11-25 RX ADMIN — HYDROMORPHONE HYDROCHLORIDE 0.5 MG: 1 INJECTION, SOLUTION INTRAMUSCULAR; INTRAVENOUS; SUBCUTANEOUS at 18:29

## 2019-11-25 RX ADMIN — OXYCODONE HYDROCHLORIDE AND ACETAMINOPHEN 1 TABLET: 7.5; 325 TABLET ORAL at 18:20

## 2019-11-25 RX ADMIN — DEXAMETHASONE SODIUM PHOSPHATE 6 MG: 10 INJECTION INTRAMUSCULAR; INTRAVENOUS at 16:35

## 2019-11-25 RX ADMIN — CLINDAMYCIN PHOSPHATE 900 MG: 900 INJECTION INTRAVENOUS at 16:15

## 2019-11-25 RX ADMIN — GLYCOPYRROLATE 0.4 MG: 0.2 INJECTION INTRAMUSCULAR; INTRAVENOUS at 17:52

## 2019-11-25 NOTE — ANESTHESIA POSTPROCEDURE EVALUATION
"Patient: German Sotelo    Procedure Summary     Date:  11/25/19 Room / Location:  [facility] OR  / [facility] MAIN OR    Anesthesia Start:  1609 Anesthesia Stop:  1806    Procedure:  FLOWCHIX PUMP CSF LEAK REPAIR/PUMP REVISION (N/A ) Diagnosis:      Surgeon:  Esvin Maya MD Provider:  Js Marshall MD    Anesthesia Type:  general ASA Status:  2          Anesthesia Type: general  Last vitals  BP   157/89 (11/25/19 1840)   Temp   36.3 °C (97.4 °F) (11/25/19 1825)   Pulse   54 (11/25/19 1840)   Resp   18 (11/25/19 1840)     SpO2   99 % (11/25/19 1840)     Post Anesthesia Care and Evaluation    Patient location during evaluation: bedside  Patient participation: complete - patient participated  Level of consciousness: sleepy but conscious  Pain score: 0  Pain management: adequate  Airway patency: patent  Anesthetic complications: No anesthetic complications    Cardiovascular status: acceptable  Respiratory status: acceptable  Hydration status: acceptable    Comments: /89 (BP Location: Left arm, Patient Position: Lying)   Pulse 54   Temp 36.3 °C (97.4 °F) (Oral)   Resp 18   Ht 177.8 cm (70\")   Wt 109 kg (240 lb 4.8 oz)   SpO2 99%   BMI 34.48 kg/m²         "

## 2019-11-25 NOTE — ANESTHESIA PREPROCEDURE EVALUATION
Anesthesia Evaluation     Patient summary reviewed and Nursing notes reviewed   no history of anesthetic complications:               Airway   Mallampati: II  TM distance: >3 FB  Neck ROM: full  no difficulty expected  Dental - normal exam     Pulmonary     breath sounds clear to auscultation  (+) asthma,  (-) shortness of breath, sleep apnea, decreased breath sounds, wheezes  Cardiovascular - normal exam  Exercise tolerance: good (4-7 METS)    Rhythm: regular  Rate: normal    (-) past MI, angina, CHF, orthopnea, PND, MAY, PVD      Neuro/Psych  (+) psychiatric history Anxiety and Depression,     (-) seizures, neuromuscular disease, TIA, CVA, dizziness/light headedness, weakness, numbness  GI/Hepatic/Renal/Endo    (+) obesity,     (-) liver disease, diabetes    Musculoskeletal     (+) back pain, chronic pain,   Abdominal  - normal exam   Substance History - negative use  (-) alcohol use, drug use     OB/GYN negative ob/gyn ROS         Other - negative ROS                       Anesthesia Plan    ASA 2     general     intravenous induction     Anesthetic plan, all risks, benefits, and alternatives have been provided, discussed and informed consent has been obtained with: patient.    Plan discussed with CRNA.

## 2019-11-25 NOTE — ANESTHESIA PROCEDURE NOTES
Airway  Urgency: elective    Date/Time: 11/25/2019 4:19 PM  Airway not difficult    General Information and Staff    Patient location during procedure: OR  CRNA: Poonam Del Angel CRNA    Indications and Patient Condition  Indications for airway management: airway protection    Preoxygenated: yes  Mask difficulty assessment: 1 - vent by mask    Final Airway Details  Final airway type: endotracheal airway      Successful airway: ETT  Cuffed: yes   Successful intubation technique: direct laryngoscopy  Endotracheal tube insertion site: oral  Blade: Jamila  Blade size: 4  ETT size (mm): 7.5  Cormack-Lehane Classification: grade IIa - partial view of glottis  Placement verified by: chest auscultation and capnometry   Measured from: lips  ETT/EBT  to lips (cm): 21  Number of attempts at approach: 1  Assessment: lips, teeth, and gum same as pre-op and atraumatic intubation    Additional Comments  Smooth IV induction. Trachea intubated. Cuff up. Dentition intact without injury.

## 2022-10-17 ENCOUNTER — AMBULATORY SURGICAL CENTER (OUTPATIENT)
Dept: URBAN - METROPOLITAN AREA SURGERY 20 | Facility: SURGERY | Age: 62
End: 2022-10-17
Payer: COMMERCIAL

## 2022-10-17 ENCOUNTER — OFFICE (OUTPATIENT)
Dept: URBAN - METROPOLITAN AREA PATHOLOGY 4 | Facility: PATHOLOGY | Age: 62
End: 2022-10-17
Payer: COMMERCIAL

## 2022-10-17 DIAGNOSIS — K63.5 POLYP OF COLON: ICD-10-CM

## 2022-10-17 DIAGNOSIS — D12.2 BENIGN NEOPLASM OF ASCENDING COLON: ICD-10-CM

## 2022-10-17 DIAGNOSIS — D12.3 BENIGN NEOPLASM OF TRANSVERSE COLON: ICD-10-CM

## 2022-10-17 DIAGNOSIS — Z12.11 ENCOUNTER FOR SCREENING FOR MALIGNANT NEOPLASM OF COLON: ICD-10-CM

## 2022-10-17 LAB
GI HISTOLOGY: A. SELECT: (no result)
GI HISTOLOGY: B. UNSPECIFIED: (no result)
GI HISTOLOGY: PDF REPORT: (no result)

## 2022-10-17 PROCEDURE — 88305 TISSUE EXAM BY PATHOLOGIST: CPT | Performed by: INTERNAL MEDICINE

## 2022-10-17 PROCEDURE — 45385 COLONOSCOPY W/LESION REMOVAL: CPT | Mod: 33 | Performed by: INTERNAL MEDICINE

## 2022-10-17 PROCEDURE — 45380 COLONOSCOPY AND BIOPSY: CPT | Mod: 33,59 | Performed by: INTERNAL MEDICINE

## 2023-05-19 ENCOUNTER — HOSPITAL ENCOUNTER (EMERGENCY)
Facility: HOSPITAL | Age: 63
Discharge: HOME | End: 2023-05-20
Attending: EMERGENCY MEDICINE
Payer: COMMERCIAL

## 2023-05-19 DIAGNOSIS — T78.2XXA ANAPHYLAXIS, INITIAL ENCOUNTER: Primary | ICD-10-CM

## 2023-05-19 PROCEDURE — 96374 THER/PROPH/DIAG INJ IV PUSH: CPT

## 2023-05-19 PROCEDURE — 25010000002 DIPHENHYDRAMINE PER 50 MG: Performed by: EMERGENCY MEDICINE

## 2023-05-19 PROCEDURE — 25010000002 METHYLPREDNISOLONE PER 125 MG: Performed by: EMERGENCY MEDICINE

## 2023-05-19 PROCEDURE — 25010000002 EPINEPHRINE (ANAPHYLAXIS) 1 MG/ML SOLUTION: Performed by: EMERGENCY MEDICINE

## 2023-05-19 PROCEDURE — 99283 EMERGENCY DEPT VISIT LOW MDM: CPT

## 2023-05-19 PROCEDURE — 96375 TX/PRO/DX INJ NEW DRUG ADDON: CPT

## 2023-05-19 PROCEDURE — 96372 THER/PROPH/DIAG INJ SC/IM: CPT

## 2023-05-19 RX ORDER — HYDROMORPHONE HYDROCHLORIDE 4 MG/1
4 TABLET ORAL EVERY 6 HOURS PRN
COMMUNITY

## 2023-05-19 RX ORDER — EPINEPHRINE 1 MG/ML
0.3 INJECTION, SOLUTION INTRAMUSCULAR; SUBCUTANEOUS ONCE
Status: COMPLETED | OUTPATIENT
Start: 2023-05-19 | End: 2023-05-19

## 2023-05-19 RX ORDER — DIPHENHYDRAMINE HYDROCHLORIDE 50 MG/ML
50 INJECTION INTRAMUSCULAR; INTRAVENOUS ONCE
Status: COMPLETED | OUTPATIENT
Start: 2023-05-19 | End: 2023-05-19

## 2023-05-19 RX ORDER — METHYLPREDNISOLONE SODIUM SUCCINATE 125 MG/2ML
125 INJECTION, POWDER, LYOPHILIZED, FOR SOLUTION INTRAMUSCULAR; INTRAVENOUS ONCE
Status: COMPLETED | OUTPATIENT
Start: 2023-05-19 | End: 2023-05-19

## 2023-05-19 RX ADMIN — EPINEPHRINE 0.3 MG: 1 INJECTION INTRAMUSCULAR; INTRAVENOUS; SUBCUTANEOUS at 23:01

## 2023-05-19 RX ADMIN — METHYLPREDNISOLONE SODIUM SUCCINATE 125 MG: 125 INJECTION, POWDER, FOR SOLUTION INTRAMUSCULAR; INTRAVENOUS at 23:04

## 2023-05-19 RX ADMIN — DIPHENHYDRAMINE HYDROCHLORIDE 50 MG: 50 INJECTION INTRAMUSCULAR; INTRAVENOUS at 23:12

## 2023-05-20 VITALS
HEART RATE: 72 BPM | BODY MASS INDEX: 32.29 KG/M2 | RESPIRATION RATE: 16 BRPM | WEIGHT: 218 LBS | TEMPERATURE: 98.6 F | HEIGHT: 69 IN | OXYGEN SATURATION: 99 % | DIASTOLIC BLOOD PRESSURE: 90 MMHG | SYSTOLIC BLOOD PRESSURE: 152 MMHG

## 2023-05-20 PROCEDURE — 96375 TX/PRO/DX INJ NEW DRUG ADDON: CPT

## 2023-05-20 RX ORDER — EPINEPHRINE 0.3 MG/.3ML
0.3 INJECTION SUBCUTANEOUS ONCE
Qty: 1 EACH | Refills: 0 | Status: SHIPPED | OUTPATIENT
Start: 2023-05-20 | End: 2023-05-20

## 2023-05-20 RX ORDER — EPINEPHRINE 0.3 MG/.3ML
0.3 INJECTION SUBCUTANEOUS ONCE
Qty: 1 EACH | Refills: 0 | Status: SHIPPED | OUTPATIENT
Start: 2023-05-20 | End: 2023-05-20 | Stop reason: SDUPTHER

## 2023-05-20 RX ORDER — FAMOTIDINE 10 MG/ML
20 INJECTION, SOLUTION INTRAVENOUS ONCE
Status: COMPLETED | OUTPATIENT
Start: 2023-05-20 | End: 2023-05-20

## 2023-05-20 RX ORDER — IPRATROPIUM BROMIDE AND ALBUTEROL SULFATE 2.5; .5 MG/3ML; MG/3ML
3 SOLUTION RESPIRATORY (INHALATION) ONCE
Status: COMPLETED | OUTPATIENT
Start: 2023-05-20 | End: 2023-05-20

## 2023-05-20 RX ADMIN — FAMOTIDINE 20 MG: 10 INJECTION, SOLUTION INTRAVENOUS at 00:14

## 2023-05-20 RX ADMIN — IPRATROPIUM BROMIDE AND ALBUTEROL SULFATE 3 ML: .5; 2.5 SOLUTION RESPIRATORY (INHALATION) at 00:10

## 2023-05-20 NOTE — DISCHARGE INSTRUCTIONS
Follow instructions given and if the reaction happens again use the medication as instructed.  You can also use Benadryl as instructed if you develop a rash, itching, airway swelling or pruritus.  Return immediately to the ER with any concerns, swelling or shortness of breath.

## 2023-05-20 NOTE — FSED PROVIDER NOTE
Subjective   History of Present Illness  Patient states he had a previous nut allergy and was eating some nuts tonight and as soon as he started eating them he felt tingling in his tongue and got redness and itching around his eyes along with diffuse itching on his body.  He was worried he was having allergic reaction and has a history of asthma so he tried his inhaler.  He does deny any nausea, vomiting, wheezing or shortness of breath.  He states he has not had a reaction like this in quite a while and felt fine prior to eating the nuts.  He denies any airway swelling but is reporting some puffiness around his eyes.  Denies any chest pain, recent cough, fever or abdominal pain.        Review of Systems   Constitutional: Negative for chills and fever.   HENT: Positive for facial swelling. Negative for rhinorrhea, sinus pain, sore throat, trouble swallowing and voice change.    Eyes: Negative for visual disturbance.   Respiratory: Negative for chest tightness and shortness of breath.    Cardiovascular: Negative for chest pain.   Gastrointestinal: Negative for abdominal pain, constipation, diarrhea, nausea and vomiting.   Genitourinary: Negative for dysuria, flank pain, hematuria and urgency.   Musculoskeletal: Negative for myalgias.   Skin: Positive for rash.   Neurological: Negative for syncope, numbness and headaches.   Psychiatric/Behavioral: Negative for confusion.       Past Medical History:   Diagnosis Date   • Anxiety    • Asthma    • Depression    • Insomnia    • MRSA infection     bilateral arm wounds-pt states over a year ago   • Serotonin syndrome 9/4/2017   • Spinal stenosis        Allergies   Allergen Reactions   • Peanut (Diagnostic) Anaphylaxis   • Lunesta [Eszopiclone] Anxiety, Mental Status Change, Irritability and Hallucinations     HOSPITALIZED        Past Surgical History:   Procedure Laterality Date   • BACK SURGERY  2004    SPINAL FUSION   • JOINT REPLACEMENT     • KNEE ARTHROPLASTY Left 2008    • PAIN PUMP INSERTION/REVISION      SEVERAL TIMES STARATED 2010 TOTAL OF FOUR    • PAIN PUMP INSERTION/REVISION Left 7/11/2016    Procedure: LT PAIN PUMP REMOVAL;  Surgeon: Esvin Maya MD;  Location: [facility] OR;  Service:    • PAIN PUMP INSERTION/REVISION N/A 11/25/2019    Procedure: FLOWCHIX PUMP CSF LEAK REPAIR/PUMP REVISION;  Surgeon: Esvin Maya MD;  Location: [facility] MAIN OR;  Service: Pain Management       History reviewed. No pertinent family history.    Social History     Socioeconomic History   • Marital status:    Tobacco Use   • Smoking status: Never   • Smokeless tobacco: Never   Substance and Sexual Activity   • Alcohol use: No   • Drug use: No   • Sexual activity: Defer           Objective   Physical Exam  Constitutional:       Appearance: He is well-developed.   HENT:      Head: Normocephalic and atraumatic.      Nose: Nose normal.      Mouth/Throat:      Mouth: Mucous membranes are moist.      Pharynx: Oropharynx is clear. No oropharyngeal exudate.   Eyes:      Conjunctiva/sclera: Conjunctivae normal.      Pupils: Pupils are equal, round, and reactive to light.   Neck:      Vascular: No JVD.      Trachea: No tracheal deviation.   Cardiovascular:      Rate and Rhythm: Normal rate and regular rhythm.      Heart sounds: Normal heart sounds. No murmur heard.    No friction rub. No gallop.   Pulmonary:      Effort: Pulmonary effort is normal. No respiratory distress.      Breath sounds: Normal breath sounds. No stridor. No wheezing or rales.   Abdominal:      General: Bowel sounds are normal. There is no distension.      Palpations: Abdomen is soft. There is no mass.      Tenderness: There is no abdominal tenderness. There is no guarding or rebound.      Hernia: No hernia is present.   Musculoskeletal:         General: No tenderness or deformity.      Cervical back: Normal range of motion and neck supple.   Lymphadenopathy:      Cervical: No cervical adenopathy.   Skin:      General: Skin is warm and dry.      Capillary Refill: Capillary refill takes less than 2 seconds.      Findings: Rash present.      Comments: Bilateral eyes have slight erythema and swelling, no appreciated rash outside of his face   Neurological:      Mental Status: He is alert and oriented to person, place, and time.      Cranial Nerves: No cranial nerve deficit.      Sensory: No sensory deficit.      Motor: No abnormal muscle tone.   Psychiatric:         Behavior: Behavior normal.         Thought Content: Thought content normal.         Judgment: Judgment normal.         Procedures           ED Course  ED Course as of 05/21/23 0151   Sat May 20, 2023   0026 Patient reports feeling much better at this time states most of his symptoms are completely gone.  He looks well and has rash around his eyes and swelling has resolved. [SM]      ED Course User Index  [SM] Joe Dumont,                                            Medical Decision Making  Anaphylaxis, initial encounter: acute illness or injury  Risk  Prescription drug management.          Final diagnoses:   Anaphylaxis, initial encounter       ED Disposition  ED Disposition     ED Disposition   Discharge    Condition   Stable    Comment   --             Judi Hensley MD  [address] [facility] [address] [phone]      As needed         Medication List      ASK your doctor about these medications    EPINEPHrine 0.3 MG/0.3ML solution auto-injector injection  Commonly known as: EPIPEN  Inject 0.3 mL under the skin into the appropriate area as directed 1 (One) Time for 1 dose.  Ask about: Should I take this medication?           Where to Get Your Medications      These medications were sent to [facility] - [place], [place] - [address] [facility] - [phone]  - [phone]   [address] [facility] [address]    Phone: [phone]   · EPINEPHrine 0.3 MG/0.3ML solution auto-injector injection

## 2023-11-03 NOTE — PROGRESS NOTES
NEUROLOGY    CHIEF COMPLAINT: Agitation, restlessness    The patient states he feels somewhat better today. He has been able to sleep quite a bit since admission.     PHYSICAL EXAMINATION:  GENERAL: 57-year-old male resting in bed in no acute distress.  VITAL SIGNS: Temperature 97.9, Pulse 56, Respirations 20, Blood pressure 114/79.  CARDIAC: Regular rate and rhythm without significant murmur or gallop.  RESPIRATIONS: Unlabored.  ABDOMEN: No hepatosplenomegaly.  EXTREMITIES: No edema.  SKIN: No rash.  NEUROLOGIC: He is awake and alert. He is oriented to person, place, time, and situation. His speech is clear. He follows all commands. Pupils are equal, round and reactive. Eye movements are full. Visual fields are full. Facial strength is symmetric. Facial sensation is equal bilaterally. Tongue protrudes midline. Strength is 5/5 in bilateral upper and lower extremities. Reflexes are 2 and symmetric throughout. Finger-to-nose, rapid movements, and heel-to-shin normal. Gait is steady.     IMPRESSION/PLAN: His MRI was reviewed and is normal. EEG is pending but will likely be normal. The patient seems to be improving. This is possibly serotonin syndrome. We will cut back his Trazodone from 200 mg nightly to 100 mg nightly. Will leave the diagnosis of shelly up to psychiatry.    Please make [facility] referral and ensure a 6 month follow up visit with me for screening. Thanks!

## 2023-12-25 ENCOUNTER — HOSPITAL ENCOUNTER (EMERGENCY)
Facility: HOSPITAL | Age: 63
Discharge: HOME | End: 2023-12-25
Attending: EMERGENCY MEDICINE | Admitting: EMERGENCY MEDICINE
Payer: COMMERCIAL

## 2023-12-25 ENCOUNTER — APPOINTMENT (OUTPATIENT)
Dept: GENERAL RADIOLOGY | Facility: HOSPITAL | Age: 63
End: 2023-12-25
Payer: COMMERCIAL

## 2023-12-25 VITALS
WEIGHT: 219 LBS | RESPIRATION RATE: 17 BRPM | BODY MASS INDEX: 31.35 KG/M2 | SYSTOLIC BLOOD PRESSURE: 145 MMHG | TEMPERATURE: 98.3 F | HEART RATE: 93 BPM | HEIGHT: 70 IN | OXYGEN SATURATION: 97 % | DIASTOLIC BLOOD PRESSURE: 76 MMHG

## 2023-12-25 DIAGNOSIS — J98.01 BRONCHOSPASM: ICD-10-CM

## 2023-12-25 DIAGNOSIS — J11.1 INFLUENZA: Primary | ICD-10-CM

## 2023-12-25 LAB
ALBUMIN SERPL-MCNC: 4.1 G/DL (ref 3.5–5.2)
ALBUMIN/GLOB SERPL: 2.2 G/DL
ALP SERPL-CCNC: 96 U/L (ref 39–117)
ALT SERPL W P-5'-P-CCNC: 28 U/L (ref 1–41)
ANION GAP SERPL CALCULATED.3IONS-SCNC: 10.1 MMOL/L (ref 5–15)
AST SERPL-CCNC: 23 U/L (ref 1–40)
BASOPHILS # BLD AUTO: 0.03 10*3/MM3 (ref 0–0.2)
BASOPHILS NFR BLD AUTO: 0.8 % (ref 0–1.5)
BILIRUB SERPL-MCNC: 0.2 MG/DL (ref 0–1.2)
BUN SERPL-MCNC: 21 MG/DL (ref 8–23)
BUN/CREAT SERPL: 19.6 (ref 7–25)
CALCIUM SPEC-SCNC: 8.4 MG/DL (ref 8.6–10.5)
CHLORIDE SERPL-SCNC: 103 MMOL/L (ref 98–107)
CO2 SERPL-SCNC: 25.9 MMOL/L (ref 22–29)
CREAT SERPL-MCNC: 1.07 MG/DL (ref 0.76–1.27)
D-LACTATE SERPL-SCNC: 1.6 MMOL/L (ref 0.5–2)
DEPRECATED RDW RBC AUTO: 39.3 FL (ref 37–54)
EGFRCR SERPLBLD CKD-EPI 2021: 78.5 ML/MIN/1.73
EOSINOPHIL # BLD AUTO: 0.02 10*3/MM3 (ref 0–0.4)
EOSINOPHIL NFR BLD AUTO: 0.5 % (ref 0.3–6.2)
ERYTHROCYTE [DISTWIDTH] IN BLOOD BY AUTOMATED COUNT: 12.4 % (ref 12.3–15.4)
FLUAV SUBTYP SPEC NAA+PROBE: DETECTED
FLUBV RNA ISLT QL NAA+PROBE: NOT DETECTED
GLOBULIN UR ELPH-MCNC: 1.9 GM/DL
GLUCOSE SERPL-MCNC: 99 MG/DL (ref 65–99)
HCT VFR BLD AUTO: 38.7 % (ref 37.5–51)
HGB BLD-MCNC: 12.7 G/DL (ref 13–17.7)
HOLD SPECIMEN: NORMAL
HOLD SPECIMEN: NORMAL
IMM GRANULOCYTES # BLD AUTO: 0.09 10*3/MM3 (ref 0–0.05)
IMM GRANULOCYTES NFR BLD AUTO: 2.3 % (ref 0–0.5)
LYMPHOCYTES # BLD AUTO: 1.07 10*3/MM3 (ref 0.7–3.1)
LYMPHOCYTES NFR BLD AUTO: 27.2 % (ref 19.6–45.3)
MCH RBC QN AUTO: 28.1 PG (ref 26.6–33)
MCHC RBC AUTO-ENTMCNC: 32.8 G/DL (ref 31.5–35.7)
MCV RBC AUTO: 85.6 FL (ref 79–97)
MONOCYTES # BLD AUTO: 0.57 10*3/MM3 (ref 0.1–0.9)
MONOCYTES NFR BLD AUTO: 14.5 % (ref 5–12)
NEUTROPHILS NFR BLD AUTO: 2.16 10*3/MM3 (ref 1.7–7)
NEUTROPHILS NFR BLD AUTO: 54.7 % (ref 42.7–76)
NT-PROBNP SERPL-MCNC: <36 PG/ML (ref 0–900)
PLATELET # BLD AUTO: 223 10*3/MM3 (ref 140–450)
PMV BLD AUTO: 8.5 FL (ref 6–12)
POTASSIUM SERPL-SCNC: 3.5 MMOL/L (ref 3.5–5.2)
PROT SERPL-MCNC: 6 G/DL (ref 6–8.5)
QT INTERVAL: 361 MS
QTC INTERVAL: 430 MS
RBC # BLD AUTO: 4.52 10*6/MM3 (ref 4.14–5.8)
RSV RNA NPH QL NAA+NON-PROBE: NOT DETECTED
SARS-COV-2 RNA RESP QL NAA+PROBE: NOT DETECTED
SODIUM SERPL-SCNC: 139 MMOL/L (ref 136–145)
TROPONIN T SERPL HS-MCNC: 11 NG/L
WBC NRBC COR # BLD AUTO: 3.94 10*3/MM3 (ref 3.4–10.8)
WHOLE BLOOD HOLD COAG: NORMAL
WHOLE BLOOD HOLD SPECIMEN: NORMAL

## 2023-12-25 PROCEDURE — 71045 X-RAY EXAM CHEST 1 VIEW: CPT

## 2023-12-25 PROCEDURE — 84484 ASSAY OF TROPONIN QUANT: CPT | Performed by: EMERGENCY MEDICINE

## 2023-12-25 PROCEDURE — 25010000002 DEXAMETHASONE SODIUM PHOSPHATE 10 MG/ML SOLUTION: Performed by: EMERGENCY MEDICINE

## 2023-12-25 PROCEDURE — 87040 BLOOD CULTURE FOR BACTERIA: CPT | Performed by: EMERGENCY MEDICINE

## 2023-12-25 PROCEDURE — 83880 ASSAY OF NATRIURETIC PEPTIDE: CPT | Performed by: EMERGENCY MEDICINE

## 2023-12-25 PROCEDURE — 87636 SARSCOV2 & INF A&B AMP PRB: CPT | Performed by: EMERGENCY MEDICINE

## 2023-12-25 PROCEDURE — 80053 COMPREHEN METABOLIC PANEL: CPT | Performed by: EMERGENCY MEDICINE

## 2023-12-25 PROCEDURE — 93005 ELECTROCARDIOGRAM TRACING: CPT | Performed by: EMERGENCY MEDICINE

## 2023-12-25 PROCEDURE — 83605 ASSAY OF LACTIC ACID: CPT | Performed by: EMERGENCY MEDICINE

## 2023-12-25 PROCEDURE — 36415 COLL VENOUS BLD VENIPUNCTURE: CPT

## 2023-12-25 PROCEDURE — 96374 THER/PROPH/DIAG INJ IV PUSH: CPT

## 2023-12-25 PROCEDURE — 87634 RSV DNA/RNA AMP PROBE: CPT | Performed by: EMERGENCY MEDICINE

## 2023-12-25 PROCEDURE — 99284 EMERGENCY DEPT VISIT MOD MDM: CPT

## 2023-12-25 PROCEDURE — 85025 COMPLETE CBC W/AUTO DIFF WBC: CPT | Performed by: EMERGENCY MEDICINE

## 2023-12-25 RX ORDER — IPRATROPIUM BROMIDE AND ALBUTEROL SULFATE 2.5; .5 MG/3ML; MG/3ML
3 SOLUTION RESPIRATORY (INHALATION) ONCE
Status: COMPLETED | OUTPATIENT
Start: 2023-12-25 | End: 2023-12-25

## 2023-12-25 RX ORDER — DEXAMETHASONE SODIUM PHOSPHATE 10 MG/ML
10 INJECTION, SOLUTION INTRAMUSCULAR; INTRAVENOUS ONCE
Status: COMPLETED | OUTPATIENT
Start: 2023-12-25 | End: 2023-12-25

## 2023-12-25 RX ORDER — OSELTAMIVIR PHOSPHATE 75 MG/1
75 CAPSULE ORAL 2 TIMES DAILY
Qty: 10 CAPSULE | Refills: 0 | Status: SHIPPED | OUTPATIENT
Start: 2023-12-25 | End: 2023-12-30

## 2023-12-25 RX ORDER — SODIUM CHLORIDE 0.9 % (FLUSH) 0.9 %
10 SYRINGE (ML) INJECTION AS NEEDED
Status: DISCONTINUED | OUTPATIENT
Start: 2023-12-25 | End: 2023-12-25 | Stop reason: HOSPADM

## 2023-12-25 RX ORDER — PREDNISONE 20 MG/1
TABLET ORAL
Qty: 8 TABLET | Refills: 0 | Status: SHIPPED | OUTPATIENT
Start: 2023-12-25

## 2023-12-25 RX ORDER — ALBUTEROL SULFATE 90 UG/1
2 AEROSOL, METERED RESPIRATORY (INHALATION) EVERY 4 HOURS PRN
Qty: 6.7 G | Refills: 0 | Status: SHIPPED | OUTPATIENT
Start: 2023-12-25

## 2023-12-25 RX ADMIN — DEXAMETHASONE SODIUM PHOSPHATE 10 MG: 10 INJECTION, SOLUTION INTRAMUSCULAR; INTRAVENOUS at 17:54

## 2023-12-25 RX ADMIN — IPRATROPIUM BROMIDE AND ALBUTEROL SULFATE 3 ML: 2.5; .5 SOLUTION RESPIRATORY (INHALATION) at 17:53

## 2023-12-25 RX ADMIN — IPRATROPIUM BROMIDE AND ALBUTEROL SULFATE 3 ML: 2.5; .5 SOLUTION RESPIRATORY (INHALATION) at 18:33

## 2023-12-25 NOTE — FSED PROVIDER NOTE
Subjective   History of Present Illness  63-year-old male with 1 week of shortness of breath and wheezing.  He is a non-smoker and has no history of asthma or COPD.  He was placed on antibiotics and albuterol treatments and steroids and is still not better.  He was visiting his father who was in the hospital with RSV and he is concerned that this may be what he has if not developing pneumonia.  He has a pulse ox at home and it was down to 94% at his worst.  Mild chest pain in terms of tightness, no nausea or vomiting or diarrhea, no back pain or neck pain or stiff or sore neck or abdominal pain.  The albuterol is not working for him.  So he came in to get seen      Review of Systems    Past Medical History:   Diagnosis Date    Anxiety     Asthma     Depression     Insomnia     MRSA infection     bilateral arm wounds-pt states over a year ago    Serotonin syndrome 9/4/2017    Spinal stenosis        Allergies   Allergen Reactions    Peanut (Diagnostic) Anaphylaxis    Lunesta [Eszopiclone] Anxiety, Mental Status Change, Irritability and Hallucinations     HOSPITALIZED        Past Surgical History:   Procedure Laterality Date    BACK SURGERY  2004    SPINAL FUSION    JOINT REPLACEMENT      KNEE ARTHROPLASTY Left 2008    PAIN PUMP INSERTION/REVISION      SEVERAL TIMES STARATED 2010 TOTAL OF FOUR     PAIN PUMP INSERTION/REVISION Left 7/11/2016    Procedure: LT PAIN PUMP REMOVAL;  Surgeon: Esvin Maya MD;  Location: [facility] OR;  Service:     PAIN PUMP INSERTION/REVISION N/A 11/25/2019    Procedure: FLOWCHIX PUMP CSF LEAK REPAIR/PUMP REVISION;  Surgeon: Esvin Maya MD;  Location: [facility] OR;  Service: Pain Management       History reviewed. No pertinent family history.    Social History     Socioeconomic History    Marital status:    Tobacco Use    Smoking status: Never    Smokeless tobacco: Never   Substance and Sexual Activity    Alcohol use: No    Drug use: No    Sexual activity: Defer            Objective   Physical Exam  Vitals and nursing note reviewed.   Constitutional:       Appearance: He is well-developed and normal weight.      Interventions: He is intubated.   HENT:      Head: Atraumatic.   Cardiovascular:      Rate and Rhythm: Normal rate and regular rhythm.      Heart sounds: No murmur heard.  Pulmonary:      Effort: Tachypnea, accessory muscle usage and respiratory distress present. No bradypnea. He is intubated.      Breath sounds: No stridor. Examination of the right-upper field reveals decreased breath sounds and wheezing. Examination of the left-upper field reveals decreased breath sounds and wheezing. Examination of the right-middle field reveals decreased breath sounds and wheezing. Examination of the left-middle field reveals decreased breath sounds and wheezing. Examination of the right-lower field reveals decreased breath sounds and wheezing. Examination of the left-lower field reveals decreased breath sounds and wheezing.   Chest:      Chest wall: No deformity, tenderness, crepitus or edema.   Abdominal:      Palpations: Abdomen is soft.   Musculoskeletal:         General: Normal range of motion.      Cervical back: Neck supple.      Right lower leg: No edema.      Left lower leg: No edema.   Skin:     General: Skin is warm.      Capillary Refill: Capillary refill takes less than 2 seconds.      Coloration: Skin is not cyanotic or pale.      Findings: No ecchymosis, erythema or rash.   Neurological:      General: No focal deficit present.      Mental Status: He is alert. He is disoriented.   Psychiatric:         Mood and Affect: Mood normal.         Behavior: Behavior normal.         Procedures           ED Course  ED Course as of 12/25/23 2033   Mon Dec 25, 2023   1753 Ordered DuoNeb and steroids.  DBC does not show anything concerning except a slight elevation of monocytes and immature granulocytes [AR]   1811 Lactate is 1.6 patient has influenza A.  The RSV is negative CMP  is within normal limits except for slightly low calcium [AR]   1812 The x-ray has increased markings without definition suggesting pneumonia [AR]   1814 Influenza a positive [AR]   1815 Joe with patient who does not want to be admitted to get a another DuoNeb and reassess.  He agreed to that [AR]   1824 Chest xray FINDINGS: Cardiac size within normal limits. Old appearing left rib  deformities. The left lung is clear. No effusion or edema seen.     There is blunting of the right costophrenic angle consistent with trace  pleural effusion or thickening. No consolidation seen within the right  lung. The remainder is unremarkable.      [AR]   1917 Signed out to Dr. Silva that the patient has influenza A and that the rest of his blood work looks good and he has been given IV steroids and has had 1 DuoNeb and needed another and he does not want to be admitted sore trying to see if he can go home he does have albuterol at home and he would need more steroids prescription.  His chest x-ray did not show a pneumonia.  His lactate is 1.6 Dr. Amin will reexamine and reevaluate to determine if he needs to go to ED observation or AMA or home [AR]      ED Course User Index  [AR] Bernadette Spangler MD                                 2030: Care was transitioned to myself at 1900 today.  Repeat exam at this time finds the patient to be much improved.  He states that he feels much better at this time.  Repeat lung exam: Equal breath sounds bilaterally, wheezes have cleared.  O2 sat is between 97 and 100% room air.    Plan: Will initiate 5 more days of steroids tomorrow, albuterol and, Tamiflu.  Appropriate return precautions discussed          Medical Decision Making  Differential diagnosis includes but not limited to RSV chioma COVID pneumonia bronchitis sinusitis reactive airway disease    Amount and/or Complexity of Data Reviewed  Labs: ordered. Decision-making details documented in ED Course.  Radiology: ordered and independent  interpretation performed.     Details: Chest x-ray increased markings without consolidation or effusion and has normal midline.  ECG/medicine tests: ordered and independent interpretation performed.     Details: EKG normal sinus rhythm rate of 85 no evidence of STEMI    Risk  Prescription drug management.        Final diagnoses:   Influenza   Bronchospasm       ED Disposition  ED Disposition       ED Disposition   Discharge    Condition   Stable    Comment   --               Judi Hensley MD  [address] [facility] [address] [phone]    In 1 week           Medication List        New Prescriptions      albuterol sulfate  (90 Base) MCG/ACT inhaler  Commonly known as: PROVENTIL HFA;VENTOLIN HFA;PROAIR HFA  Inhale 2 puffs Every 4 (Four) Hours As Needed for Shortness of Air (and / or cough).     oseltamivir 75 MG capsule  Commonly known as: TAMIFLU  Take 1 capsule by mouth 2 (Two) Times a Day for 5 days.     predniSONE 20 MG tablet  Commonly known as: DELTASONE  2 tabs po daily x 4 days with food               Where to Get Your Medications        These medications were sent to [facility] - [place], [place] - [address] [place] [address] - [phone]  - [phone]   [address] [facility] [address]      Phone: [phone]   albuterol sulfate  (90 Base) MCG/ACT inhaler  oseltamivir 75 MG capsule  predniSONE 20 MG tablet

## 2023-12-26 NOTE — DISCHARGE INSTRUCTIONS
Today you do have influenza A.  Your COVID is negative.    You were noted to have significant wheezing upon initial exam.  You were given oral dexamethasone, steroid.    I sent in prescriptions to start tomorrow.  This includes a 4-day course of prednisone, and inhaler, and Tamiflu.  Take as directed    Utilize Tylenol and/or ibuprofen should you develop fever.    You are going to need to rest, increase fluids, and return for increased shortness of breath or other difficulties    Please read all of the instructions in this handout.  If you receive prescriptions please fill them and take them as directed.  Please call your primary care physician for follow-up appointment in the next 5 to 7 days.  If you do not have a physician you may call the [facility] referral line at [phone].    You may return to the emergency department at any time for any concerns such as worsening symptoms.  If you received a work or school note it will be printed at the back of this packet.

## 2023-12-30 LAB — BACTERIA SPEC AEROBE CULT: NORMAL

## (undated) DEVICE — DRSNG WND GZ PAD BORDERED 4X8IN STRL

## (undated) DEVICE — GOWN,NON-REINFORCED,SIRUS,SET IN SLV,XXL: Brand: MEDLINE

## (undated) DEVICE — PK CHST BRST 40

## (undated) DEVICE — NDL SPINE 22G 31/2IN BLK

## (undated) DEVICE — NDL HYPO PRECISIONGLIDE REG 25G 1 1/2

## (undated) DEVICE — DRP C/ARM 41X74IN

## (undated) DEVICE — SUT ETHIB 0 CT2 CR8 18IN CX27D

## (undated) DEVICE — 3M™ IOBAN™ 2 ANTIMICROBIAL INCISE DRAPE 6650EZ: Brand: IOBAN™ 2

## (undated) DEVICE — GLV SURG TRIUMPH CLASSIC PF LTX 8.5 STRL

## (undated) DEVICE — APPL CHLORAPREP W/TINT 26ML ORNG

## (undated) DEVICE — ANTIBACTERIAL UNDYED BRAIDED (POLYGLACTIN 910), SYNTHETIC ABSORBABLE SUTURE: Brand: COATED VICRYL

## (undated) DEVICE — ADHS SKIN DERMABOND TOP ADVANCED

## (undated) DEVICE — GLV SURG TRIUMPH CLASSIC PF LTX 8 STRL

## (undated) DEVICE — SUT SILK 0 TIES 18IN SA66G

## (undated) DEVICE — SYR LL 3CC

## (undated) DEVICE — SYR LUERLOK 20CC BX/50

## (undated) DEVICE — DRSNG TELFA PAD NONADH STR 1S 3X4IN

## (undated) DEVICE — THE CATHETER REVISION KIT IS A STERILE KIT DESIGNED TO FACILITATE THE REPLACEMENT OF A SECTION OF THE INTRATHECAL CATHETER.  A REPLACEMENT CATHETER SEGMENT IS PROVIDED WITH A CATHETER CONNECTOR (AND SLEEVE) TO ALLOW THIS SEGMENT TO BE ATTACHED TO THE EXISTING CATHETER.: Brand: PROMETRA CATHETER REVISION KIT

## (undated) DEVICE — THE TUNNELER IS USED FOR SUBCUTANEOUS PLACEMENT OF THE INTRATHECAL CATHETER. IT IS A STERILE, MALLEABLE STAINLESS STEEL TUNNELER WITH A POINTED TIP TO PENETRATE SUBCUTANEOUS TISSUE AND A THREADED END FOR ATTACHMENT TO THE INTRATHECAL CATHETER.: Brand: PROMETRA TUNNELER